# Patient Record
Sex: MALE | Race: WHITE | NOT HISPANIC OR LATINO | Employment: STUDENT | URBAN - METROPOLITAN AREA
[De-identification: names, ages, dates, MRNs, and addresses within clinical notes are randomized per-mention and may not be internally consistent; named-entity substitution may affect disease eponyms.]

---

## 2018-12-27 ENCOUNTER — CONSULT (OUTPATIENT)
Dept: FAMILY MEDICINE CLINIC | Facility: CLINIC | Age: 23
End: 2018-12-27
Payer: COMMERCIAL

## 2018-12-27 VITALS
HEART RATE: 71 BPM | RESPIRATION RATE: 16 BRPM | WEIGHT: 198 LBS | HEIGHT: 71 IN | BODY MASS INDEX: 27.72 KG/M2 | DIASTOLIC BLOOD PRESSURE: 74 MMHG | SYSTOLIC BLOOD PRESSURE: 124 MMHG | OXYGEN SATURATION: 97 %

## 2018-12-27 DIAGNOSIS — Z01.818 PREOP EXAMINATION: ICD-10-CM

## 2018-12-27 DIAGNOSIS — Z23 NEEDS FLU SHOT: Primary | ICD-10-CM

## 2018-12-27 PROCEDURE — 99203 OFFICE O/P NEW LOW 30 MIN: CPT | Performed by: NURSE PRACTITIONER

## 2018-12-27 NOTE — PROGRESS NOTES
Assessment/Plan:     Diagnoses and all orders for this visit:    Needs flu shot  -     MULTI-DOSE VIAL: influenza vaccine, [id number], quadrivalent, 0 5 mL (AFLURIA, FLULAVAL, FLUZONE)    Preop examination      Cleared for oral surgery  Form completed and given to pt  Subjective:      Patient ID: Eric Rosado is a 20 y o  male  Here today for a pre-operative clearance for oral surgery on 01/10/2019 by Dr Earnest Ding  He reports a hx of a front lower tooth which never grew causing a cyst to form around it on her mandible  He denies any overt medical hx  He has had his wisdom teeth extracted and tongue surgery as an infant with no issues/complications to anesthesia  Denies any knowledge of family hx of issues with anesthesia  The following portions of the patient's history were reviewed and updated as appropriate: allergies, current medications, past family history, past medical history, past social history, past surgical history and problem list     Review of Systems   Constitutional: Negative  Respiratory: Negative  Cardiovascular: Negative  Neurological: Negative  Objective:      /74 (BP Location: Right arm, Patient Position: Sitting, Cuff Size: Standard)   Pulse 71   Resp 16   Ht 5' 10 5" (1 791 m)   Wt 89 8 kg (198 lb)   SpO2 97%   BMI 28 01 kg/m²          Physical Exam   Constitutional: He is oriented to person, place, and time  He appears well-developed and well-nourished  HENT:   Head: Normocephalic and atraumatic  Neck: Neck supple  No JVD present  No tracheal deviation present  No thyromegaly present  Cardiovascular: Normal rate, regular rhythm and normal heart sounds  Exam reveals no gallop and no friction rub  No murmur heard  Pulmonary/Chest: No respiratory distress  He has no wheezes  He has no rales  Lymphadenopathy:     He has no cervical adenopathy  Neurological: He is alert and oriented to person, place, and time     Psychiatric: He has a normal mood and affect  His behavior is normal  Judgment and thought content normal    Vitals reviewed  I have spent 25 minutes with Patient  today in which greater than 50% of this time was spent in counseling/coordination of care regarding Intructions for management and Patient and family education

## 2023-07-10 ENCOUNTER — HOSPITAL ENCOUNTER (EMERGENCY)
Facility: HOSPITAL | Age: 28
Discharge: HOME | End: 2023-07-10
Attending: EMERGENCY MEDICINE
Payer: COMMERCIAL

## 2023-07-10 VITALS
BODY MASS INDEX: 23.8 KG/M2 | HEIGHT: 71 IN | HEART RATE: 72 BPM | TEMPERATURE: 98.2 F | WEIGHT: 170 LBS | OXYGEN SATURATION: 98 % | RESPIRATION RATE: 16 BRPM | SYSTOLIC BLOOD PRESSURE: 121 MMHG | DIASTOLIC BLOOD PRESSURE: 66 MMHG

## 2023-07-10 DIAGNOSIS — R19.7 DIARRHEA: ICD-10-CM

## 2023-07-10 DIAGNOSIS — N41.8: Primary | ICD-10-CM

## 2023-07-10 DIAGNOSIS — A56.19: Primary | ICD-10-CM

## 2023-07-10 LAB
ALBUMIN SERPL BCP-MCNC: 4.1 G/DL (ref 3.5–5)
ALP SERPL-CCNC: 47 U/L (ref 34–104)
ALT SERPL W P-5'-P-CCNC: 14 U/L (ref 7–52)
ANION GAP SERPL CALCULATED.3IONS-SCNC: 11 MMOL/L
AST SERPL W P-5'-P-CCNC: 12 U/L (ref 13–39)
BASOPHILS # BLD AUTO: 0.07 THOUSANDS/ÂΜL (ref 0–0.1)
BASOPHILS NFR BLD AUTO: 1 % (ref 0–1)
BILIRUB SERPL-MCNC: 0.49 MG/DL (ref 0.2–1)
BILIRUB UR QL STRIP: ABNORMAL
BUN SERPL-MCNC: 9 MG/DL (ref 5–25)
CALCIUM SERPL-MCNC: 9.3 MG/DL (ref 8.4–10.2)
CHLORIDE SERPL-SCNC: 102 MMOL/L (ref 96–108)
CLARITY UR: CLEAR
CO2 SERPL-SCNC: 26 MMOL/L (ref 21–32)
COLOR UR: YELLOW
CREAT SERPL-MCNC: 1.07 MG/DL (ref 0.6–1.3)
EOSINOPHIL # BLD AUTO: 0.07 THOUSAND/ÂΜL (ref 0–0.61)
EOSINOPHIL NFR BLD AUTO: 1 % (ref 0–6)
ERYTHROCYTE [DISTWIDTH] IN BLOOD BY AUTOMATED COUNT: 13.2 % (ref 11.6–15.1)
GFR SERPL CREATININE-BSD FRML MDRD: 93 ML/MIN/1.73SQ M
GLUCOSE SERPL-MCNC: 107 MG/DL (ref 65–140)
GLUCOSE UR STRIP-MCNC: NEGATIVE MG/DL
HCT VFR BLD AUTO: 47.6 % (ref 36.5–49.3)
HGB BLD-MCNC: 15.6 G/DL (ref 12–17)
HGB UR QL STRIP.AUTO: NEGATIVE
IMM GRANULOCYTES # BLD AUTO: 0.02 THOUSAND/UL (ref 0–0.2)
IMM GRANULOCYTES NFR BLD AUTO: 0 % (ref 0–2)
KETONES UR STRIP-MCNC: ABNORMAL MG/DL
LEUKOCYTE ESTERASE UR QL STRIP: NEGATIVE
LIPASE SERPL-CCNC: 18 U/L (ref 11–82)
LYMPHOCYTES # BLD AUTO: 2.67 THOUSANDS/ÂΜL (ref 0.6–4.47)
LYMPHOCYTES NFR BLD AUTO: 24 % (ref 14–44)
MCH RBC QN AUTO: 28.2 PG (ref 26.8–34.3)
MCHC RBC AUTO-ENTMCNC: 32.8 G/DL (ref 31.4–37.4)
MCV RBC AUTO: 86 FL (ref 82–98)
MONOCYTES # BLD AUTO: 1.18 THOUSAND/ÂΜL (ref 0.17–1.22)
MONOCYTES NFR BLD AUTO: 11 % (ref 4–12)
NEUTROPHILS # BLD AUTO: 6.95 THOUSANDS/ÂΜL (ref 1.85–7.62)
NEUTS SEG NFR BLD AUTO: 63 % (ref 43–75)
NITRITE UR QL STRIP: NEGATIVE
NRBC BLD AUTO-RTO: 0 /100 WBCS
PH UR STRIP.AUTO: 5.5 [PH]
PLATELET # BLD AUTO: 314 THOUSANDS/UL (ref 149–390)
PMV BLD AUTO: 10.2 FL (ref 8.9–12.7)
POTASSIUM SERPL-SCNC: 3.4 MMOL/L (ref 3.5–5.3)
PROT SERPL-MCNC: 6.7 G/DL (ref 6.4–8.4)
PROT UR STRIP-MCNC: NEGATIVE MG/DL
RBC # BLD AUTO: 5.54 MILLION/UL (ref 3.88–5.62)
SODIUM SERPL-SCNC: 139 MMOL/L (ref 135–147)
SP GR UR STRIP.AUTO: 1.01 (ref 1–1.03)
UROBILINOGEN UR QL STRIP.AUTO: 0.2 E.U./DL
WBC # BLD AUTO: 10.96 THOUSAND/UL (ref 4.31–10.16)

## 2023-07-10 PROCEDURE — 96360 HYDRATION IV INFUSION INIT: CPT

## 2023-07-10 PROCEDURE — 81003 URINALYSIS AUTO W/O SCOPE: CPT

## 2023-07-10 PROCEDURE — 80053 COMPREHEN METABOLIC PANEL: CPT

## 2023-07-10 PROCEDURE — 36415 COLL VENOUS BLD VENIPUNCTURE: CPT

## 2023-07-10 PROCEDURE — 83690 ASSAY OF LIPASE: CPT

## 2023-07-10 PROCEDURE — 99283 EMERGENCY DEPT VISIT LOW MDM: CPT

## 2023-07-10 PROCEDURE — 85025 COMPLETE CBC W/AUTO DIFF WBC: CPT

## 2023-07-10 PROCEDURE — 96372 THER/PROPH/DIAG INJ SC/IM: CPT

## 2023-07-10 RX ORDER — DOXYCYCLINE HYCLATE 100 MG/1
100 CAPSULE ORAL 2 TIMES DAILY
Qty: 18 CAPSULE | Refills: 0 | Status: SHIPPED | OUTPATIENT
Start: 2023-07-10 | End: 2023-07-19

## 2023-07-10 RX ORDER — POTASSIUM CHLORIDE 20 MEQ/1
40 TABLET, EXTENDED RELEASE ORAL ONCE
Status: COMPLETED | OUTPATIENT
Start: 2023-07-10 | End: 2023-07-10

## 2023-07-10 RX ORDER — BUPROPION HYDROCHLORIDE 150 MG/1
150 TABLET ORAL DAILY
COMMUNITY

## 2023-07-10 RX ADMIN — SODIUM CHLORIDE 1000 ML: 0.9 INJECTION, SOLUTION INTRAVENOUS at 13:25

## 2023-07-10 RX ADMIN — LIDOCAINE HYDROCHLORIDE 500 MG: 10 INJECTION, SOLUTION EPIDURAL; INFILTRATION; INTRACAUDAL; PERINEURAL at 15:15

## 2023-07-10 RX ADMIN — POTASSIUM CHLORIDE 40 MEQ: 1500 TABLET, EXTENDED RELEASE ORAL at 14:52

## 2023-07-10 NOTE — ED ATTENDING ATTESTATION
7/10/2023      Wagner Mccloud DO, saw and evaluated the patient. I have discussed the patient with Dr Jeanne Chun and agree with his findings, Plan of Care, and MDM as documented in his note, except where noted. All available labs and Radiology studies were reviewed. I was present for key portions of any procedure(s) performed by Dr Jeanne Chun, and I was immediately available to provide assistance. At this point I agree with the current assessment done in the Emergency Department. I have conducted an independent evaluation of this patient. The history and physical is as follows:    29-year-old male presents to the emergency department for evaluation of diarrhea that is not present for the past 4 days consisting of completely liquid/nb stool accompanied by mild abdominal cramping along with urinary frequency/urgency/dysuria without hematuria; this is accompanied by a suprapubic burning/pressure that he also feels in the perineum. This seems to be worsening since yesterday. He was placed on doxycycline 4 days ago after being evaluated in urgent care for the same symptoms. He had urinalysis performed at the urgent care that was normal, but had subsequent testing performed at [facility] that confirmed chlamydial infection. This was based on a urinary sample. He undergoes frequent STI screenings, and has never had an STI previously. Has taken doxycycline on 1 previous occasion without diarrhea as far as he can recall. No prior GI/ surgery. No prior history of prostatitis. Sexually active with male partners with both insertive and receptive anal intercourse. Does use barrier contraceptives at times but not consistently. Not on HIV PrEP.   ROS as per HPI; otherwise negative. General: Awake/alert/no distress. Vital signs and nursing notes reviewed    HEENT:  Normocephalic/atraumatic  External ear/hearing wnl bilaterally. Neck:  Phonation normal with no stridor/dysphonia. Normal active ROM.   No palpable masses or thyromegaly. No overlying skin changes. Cardiac:  Radial pulses 2+ bilaterally  DP/PT pulses 2+ bilaterally  RRR with s1/s2; no m/r/g. Pulmonary:   No respiratory distress. No accessory muscle use  Lungs CTA b/l with no w/c/r    Abdomen:  Flat. Nondistended. Nontender. No palpable masses. No guarding/rebound ttp. No CVA ttp. Skin:  Warm/dry. No diaphoresis. No visible rashes or skin changes. Neuro:  GCS 15. PERRLA; EOMI. Facial expressions symmetric. Tongue/uvula midline. Shoulder shrug equal bilaterally  Strength 5/5 in UE/LE bilaterally  Intact sensation in UE/LE bilaterally. ED Course  ED Course as of 07/10/23 1529   Mon Jul 10, 2023   1342 CBC and differential(!)  Mild leukocytosis. Hemoglobin/hematocrit normal.  Platelets normal.   6844 UA w Reflex to Microscopic w Reflex to Culture(!)  Trace ketones with small bilirubin. 1403 Lipase  Normal   1403 Comprehensive metabolic panel(!)  Mild hypokalemia. Mildly decreased AST. Renal function normal.     Lab testing showed mild hypokalemia likely secondary to diarrhea but no other significant disturbances. Mild leukocytosis present. No evidence of urinary infection was present, suggesting that there was not a secondary superimposed bacterial lower urinary tract infection/prostatitis in addition to chlamydial infection. Given that chlamydial infection was confirmed on outside testing, this seems to be more complicated than a simple urethritis and instead has progressed to a prostatitis. Treatment recommendations include administration of ceftriaxone as well as 14 days of doxycycline. This would be appropriate in this case given the prostatitis symptoms. Diarrhea seems unfortunately to be secondary to the doxycycline use although again without any concerning or dangerous features. He could certainly use an antidiarrheal/antimotility agent in the setting of diarrhea to permit him to complete the antibiotic therapy. Will need test of cure following completion of antibiotic therapy. All questions were answered to the satisfaction of patient prior to discharge. He expressed understanding and agreed to plan.       Critical Care Time  Procedures

## 2023-07-10 NOTE — ED PROVIDER NOTES
History  Chief Complaint   Patient presents with   • Diarrhea     Patient reports testing positive for Chlamydia and thinks he is having complications for it. Reports abdominal pain, diarrhea, genital pain and rectum pain. Last took 1000 mg tylenol and 2 tablets imodium approximately 2 hrs ago. 25-year-old male with history of STI (chlamydia) diagnosed by urine 5 days ago, presents today with continued urinary frequency/urgency/dysuria and diarrhea. Started doxycycline 4 days ago and has been compliant. States the diarrhea started proxy 1 day after initiating antibiotics. He did note approximately 10 loose bowel movements a day with streaks of blood on toilet paper recently. Denies history of hemorrhoids. Patient also noted suprapubic pain that he describes as burning/pressure. Symptoms have not resolved and has been constant. Also noted feelings of fluctuating temperatures with alternating warm/chills, never measured with a thermometer. Denies chest pain/shortness of breath, headache/vision changes. Prior to Admission Medications   Prescriptions Last Dose Informant Patient Reported? Taking? buPROPion (WELLBUTRIN XL) 150 mg 24 hr tablet   Yes Yes   Sig: Take 150 mg by mouth daily      Facility-Administered Medications: None       Past Medical History:   Diagnosis Date   • Depression        History reviewed. No pertinent surgical history. Family History   Problem Relation Age of Onset   • No Known Problems Mother    • No Known Problems Father      I have reviewed and agree with the history as documented. E-Cigarette/Vaping   • E-Cigarette Use Never User      E-Cigarette/Vaping Substances     Social History     Tobacco Use   • Smoking status: Former     Types: Cigarettes     Quit date:      Years since quittin.5   • Smokeless tobacco: Never   Vaping Use   • Vaping Use: Never used   Substance Use Topics   • Alcohol use:  Yes   • Drug use: No        Review of Systems Constitutional: Negative for chills and fever. HENT: Negative for hearing loss and rhinorrhea. Eyes: Negative for visual disturbance. Respiratory: Negative for cough and shortness of breath. Cardiovascular: Negative for chest pain. Gastrointestinal: Positive for abdominal pain (Suprapubic), blood in stool (Streaks on toilet paper), diarrhea and rectal pain. Negative for constipation, nausea and vomiting. Genitourinary: Positive for dysuria, penile pain and urgency. Negative for hematuria. Musculoskeletal: Negative for back pain. Skin: Negative for color change and rash. Neurological: Negative for weakness and numbness. Psychiatric/Behavioral: Negative for agitation. All other systems reviewed and are negative. Physical Exam  ED Triage Vitals [07/10/23 1223]   Temperature Pulse Respirations Blood Pressure SpO2   98.2 °F (36.8 °C) 75 18 121/59 96 %      Temp Source Heart Rate Source Patient Position - Orthostatic VS BP Location FiO2 (%)   Temporal Monitor Sitting Left arm --      Pain Score       3             Orthostatic Vital Signs  Vitals:    07/10/23 1223 07/10/23 1453   BP: 121/59 121/66   Pulse: 75 72   Patient Position - Orthostatic VS: Sitting Sitting       Physical Exam  Vitals and nursing note reviewed. Constitutional:       Appearance: He is well-developed. HENT:      Head: Normocephalic and atraumatic. Right Ear: External ear normal.      Left Ear: External ear normal.      Nose: Nose normal.      Mouth/Throat:      Mouth: Mucous membranes are moist.   Eyes:      Extraocular Movements: Extraocular movements intact. Cardiovascular:      Rate and Rhythm: Normal rate and regular rhythm. Pulses: Normal pulses. Heart sounds: Normal heart sounds. No murmur heard. Pulmonary:      Effort: Pulmonary effort is normal. No respiratory distress. Breath sounds: Normal breath sounds. Abdominal:      Palpations: Abdomen is soft. Tenderness:  There is no abdominal tenderness. Genitourinary:     Penis: Normal.       Rectum: Normal.   Musculoskeletal:         General: Normal range of motion. Cervical back: Neck supple. Skin:     General: Skin is warm and dry. Neurological:      General: No focal deficit present. Mental Status: He is alert.    Psychiatric:         Mood and Affect: Mood normal.         ED Medications  Medications   sodium chloride 0.9 % bolus 1,000 mL (0 mL Intravenous Stopped 7/10/23 1439)   potassium chloride (K-DUR,KLOR-CON) CR tablet 40 mEq (40 mEq Oral Given 7/10/23 1452)   cefTRIAXone (ROCEPHIN) 500 mg in lidocaine (PF) (XYLOCAINE-MPF) 1 % IM only syringe (500 mg Intramuscular Given 7/10/23 1515)       Diagnostic Studies  Results Reviewed     Procedure Component Value Units Date/Time    Comprehensive metabolic panel [[id number]]  (Abnormal) Collected: 07/10/23 1323    Lab Status: Final result Specimen: Blood from Arm, Right Updated: 07/10/23 1347     Sodium 139 mmol/L      Potassium 3.4 mmol/L      Chloride 102 mmol/L      CO2 26 mmol/L      ANION GAP 11 mmol/L      BUN 9 mg/dL      Creatinine 1.07 mg/dL      Glucose 107 mg/dL      Calcium 9.3 mg/dL      AST 12 U/L      ALT 14 U/L      Alkaline Phosphatase 47 U/L      Total Protein 6.7 g/dL      Albumin 4.1 g/dL      Total Bilirubin 0.49 mg/dL      eGFR 93 ml/min/1.73sq m     Narrative:      [facility] guidelines for Chronic Kidney Disease (CKD):   •  Stage 1 with normal or high GFR (GFR > 90 mL/min/1.73 square meters)  •  Stage 2 Mild CKD (GFR = 60-89 mL/min/1.73 square meters)  •  Stage 3A Moderate CKD (GFR = 45-59 mL/min/1.73 square meters)  •  Stage 3B Moderate CKD (GFR = 30-44 mL/min/1.73 square meters)  •  Stage 4 Severe CKD (GFR = 15-29 mL/min/1.73 square meters)  •  Stage 5 End Stage CKD (GFR <15 mL/min/1.73 square meters)  Note: GFR calculation is accurate only with a steady state creatinine    Lipase [[id number]]  (Normal) Collected: 07/10/23 4835 Lab Status: Final result Specimen: Blood from Arm, Right Updated: 07/10/23 1347     Lipase 18 u/L     UA w Reflex to Microscopic w Reflex to Culture [[id number]]  (Abnormal) Collected: 07/10/23 1323    Lab Status: Final result Specimen: Urine, Clean Catch Updated: 07/10/23 1331     Color, UA Yellow     Clarity, UA Clear     Specific Gravity, UA 1.010     pH, UA 5.5     Leukocytes, UA Negative     Nitrite, UA Negative     Protein, UA Negative mg/dl      Glucose, UA Negative mg/dl      Ketones, UA Trace mg/dl      Urobilinogen, UA 0.2 E.U./dl      Bilirubin, UA Small     Occult Blood, UA Negative    CBC and differential [[id number]]  (Abnormal) Collected: 07/10/23 1323    Lab Status: Final result Specimen: Blood from Arm, Right Updated: 07/10/23 1329     WBC 10.96 Thousand/uL      RBC 5.54 Million/uL      Hemoglobin 15.6 g/dL      Hematocrit 47.6 %      MCV 86 fL      MCH 28.2 pg      MCHC 32.8 g/dL      RDW 13.2 %      MPV 10.2 fL      Platelets 217 Thousands/uL      nRBC 0 /100 WBCs      Neutrophils Relative 63 %      Immat GRANS % 0 %      Lymphocytes Relative 24 %      Monocytes Relative 11 %      Eosinophils Relative 1 %      Basophils Relative 1 %      Neutrophils Absolute 6.95 Thousands/µL      Immature Grans Absolute 0.02 Thousand/uL      Lymphocytes Absolute 2.67 Thousands/µL      Monocytes Absolute 1.18 Thousand/µL      Eosinophils Absolute 0.07 Thousand/µL      Basophils Absolute 0.07 Thousands/µL                  No orders to display         Procedures  Procedures      ED Course  ED Course as of 07/10/23 1515   Mon Jul 10, 2023   1340 Patient denied rectal exam at this time. 1340 UA w Reflex to Microscopic w Reflex to Culture(!)  UA showed no leukocytes or nitrites. Trace ketones. Patient given liter bolus NS.   1341 CBC and differential(!)  Very minor leukocytosis, otherwise within normal limits and reassuring.                              SBIRT 22yo+    Flowsheet Row Most Recent Value   Initial Alcohol Screen: US AUDIT-C     1. How often do you have a drink containing alcohol? 0 Filed at: 07/10/2023 1223   2. How many drinks containing alcohol do you have on a typical day you are drinking? 0 Filed at: 07/10/2023 1223   3a. Male UNDER 65: How often do you have five or more drinks on one occasion? 0 Filed at: 07/10/2023 1223   3b. FEMALE Any Age, or MALE 65+: How often do you have 4 or more drinks on one occassion? 0 Filed at: 07/10/2023 1223   Audit-C Score 0 Filed at: 07/10/2023 1223   AMMON: How many times in the past year have you. .. Used an illegal drug or used a prescription medication for non-medical reasons? Never Filed at: 07/10/2023 1223                Medical Decision Making  DDx including but not limited to: prostatitis, viral illness, colitis, enteritis, metabolic abnormality, appendicitis, pancreatitis, hepatitis, mesenteric adenitis, cholecystitis, pyelonephritis, UTI, renal colicMac Jean presented due to continued pain and irritation in his genital region. He was diagnosed with chlamydia 5 days ago and started on tetracycline for treatment. He states he has had continued dysuria. He has now developed diarrhea several times a day. He also notes suprapubic pain and pressure. Clinical suspicion was high for prostatitis due to the myriad of symptoms. Patient did deny rectal exam at this time. Physical exam was largely unremarkable with no Goodrich sign or tenderness at McBurney's point. Patient denied any recent sick contacts. Lab work showed very mild leukocytosis and hypokalemia, otherwise within normal limits and reassuring with no signs of endorgan damage or systemic infection. UA showed no signs of infection including pyelonephritis, UTI. Lipase normal.  Due to his myriad of symptoms he will be treated for prostatitis and was given 500 mg Rocephin IM here in the ED and his tetracycline will be extended for another 7 days. Patient understood and agreed with this plan.   Patient discharged in stable condition. Strict return precautions given if symptoms are worsening or not resolving. Amount and/or Complexity of Data Reviewed  Labs: ordered. Decision-making details documented in ED Course. Risk  Prescription drug management. Disposition  Final diagnoses:   Chlamydial prostatitis   Diarrhea     Time reflects when diagnosis was documented in both MDM as applicable and the Disposition within this note     Time User Action Codes Description Comment    7/10/2023  2:32 PM Cira [place] Add [A56.19,  N41.8] Chlamydial prostatitis     7/10/2023  2:32 PM Cira [place] Add [R19.7] Diarrhea       ED Disposition     ED Disposition   Discharge    Condition   Stable    Date/Time   Mon Jul 10, 2023  2:32 PM    Comment   Stan Kilgore discharge to home/self care. Follow-up Information     Follow up With Specialties Details Why Contact Info Additional [id number] [facility], [address] [place] [address], Nurse Practitioner Call in 3 days As needed, For ED follow-up [address] [place] [id number]       [facility] Emergency Department Emergency Medicine Go to  If symptoms worsen or do not resolve [address] [place] [id number]  [address] [place] [address] Emergency Department, [address] [place] [address], [facility]          Patient's Medications   Discharge Prescriptions    DOXYCYCLINE HYCLATE (VIBRAMYCIN) 100 MG CAPSULE    Take 1 capsule (100 mg total) by mouth 2 (two) times a day for 9 days       Start Date: 7/10/2023 End Date: 7/19/2023       Order Dose: 100 mg       Quantity: 18 capsule    Refills: 0     No discharge procedures on file. PDMP Review     None           ED Provider  Attending physically available and evaluated Stan Kilgore. I managed the patient along with the ED Attending.     Electronically Signed by         Shilpa Oviedo DO  07/10/23 8759

## 2024-06-21 ENCOUNTER — RA CDI HCC (OUTPATIENT)
Dept: OTHER | Facility: HOSPITAL | Age: 29
End: 2024-06-21

## 2024-06-21 NOTE — PROGRESS NOTES
HCC coding opportunities       Chart reviewed, no opportunity found: CHART REVIEWED, NO OPPORTUNITY FOUND       Patients Insurance        Commercial Insurance: [employer] Commercial [employer]

## 2024-07-15 DIAGNOSIS — Z00.6 ENCOUNTER FOR EXAMINATION FOR NORMAL COMPARISON OR CONTROL IN CLINICAL RESEARCH PROGRAM: ICD-10-CM

## 2024-08-12 ENCOUNTER — OFFICE VISIT (OUTPATIENT)
Dept: FAMILY MEDICINE CLINIC | Facility: CLINIC | Age: 29
End: 2024-08-12
Payer: COMMERCIAL

## 2024-08-12 VITALS
HEART RATE: 95 BPM | BODY MASS INDEX: 21.19 KG/M2 | WEIGHT: 151.4 LBS | DIASTOLIC BLOOD PRESSURE: 62 MMHG | OXYGEN SATURATION: 95 % | HEIGHT: 71 IN | SYSTOLIC BLOOD PRESSURE: 122 MMHG

## 2024-08-12 DIAGNOSIS — Z11.4 SCREENING FOR HIV (HUMAN IMMUNODEFICIENCY VIRUS): ICD-10-CM

## 2024-08-12 DIAGNOSIS — Z13.1 SCREENING FOR DIABETES MELLITUS: ICD-10-CM

## 2024-08-12 DIAGNOSIS — Z13.220 SCREENING, LIPID: ICD-10-CM

## 2024-08-12 DIAGNOSIS — A51.49 SECONDARY SYPHILIS IN MALE: ICD-10-CM

## 2024-08-12 DIAGNOSIS — F33.1 MODERATE EPISODE OF RECURRENT MAJOR DEPRESSIVE DISORDER (HCC): Primary | ICD-10-CM

## 2024-08-12 PROCEDURE — 99385 PREV VISIT NEW AGE 18-39: CPT | Performed by: INTERNAL MEDICINE

## 2024-08-12 PROCEDURE — 99204 OFFICE O/P NEW MOD 45 MIN: CPT | Performed by: INTERNAL MEDICINE

## 2024-08-12 RX ORDER — BUPROPION HYDROCHLORIDE 150 MG/1
150 TABLET ORAL DAILY
Qty: 90 TABLET | Refills: 0 | Status: SHIPPED | OUTPATIENT
Start: 2024-08-12
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
## 2024-08-19 ENCOUNTER — TELEPHONE (OUTPATIENT)
Age: 29
End: 2024-08-19

## 2024-08-19 NOTE — TELEPHONE ENCOUNTER
Patient called in to schedule an id consult , I advise the patient he will need referral , patient will call his PCP doctor to put in a referral , I also advised the patient once we receive the referral the referral will be reviewed and we will contact him .

## 2024-08-21 ENCOUNTER — TELEPHONE (OUTPATIENT)
Dept: SURGERY | Facility: CLINIC | Age: 29
End: 2024-08-21

## 2024-08-21 DIAGNOSIS — Z13.1 SCREENING FOR DIABETES MELLITUS: ICD-10-CM

## 2024-08-21 DIAGNOSIS — Z01.84 IMMUNITY STATUS TESTING: ICD-10-CM

## 2024-08-21 DIAGNOSIS — Z11.1 SCREENING-PULMONARY TB: ICD-10-CM

## 2024-08-21 DIAGNOSIS — B20 HIV DISEASE (HCC): Primary | ICD-10-CM

## 2024-08-21 DIAGNOSIS — Z11.59 ENCOUNTER FOR SCREENING FOR OTHER VIRAL DISEASES: ICD-10-CM

## 2024-08-21 DIAGNOSIS — Z01.812 BLOOD TESTS PRIOR TO TREATMENT OR PROCEDURE: ICD-10-CM

## 2024-08-21 DIAGNOSIS — Z79.899 OTHER LONG TERM (CURRENT) DRUG THERAPY: ICD-10-CM

## 2024-08-21 NOTE — TELEPHONE ENCOUNTER
Attempted to contact patient, as a means to confirm interest/initiation with [facility] program.   Message left, instructing patient to call [phone] to discuss/review further.

## 2024-08-22 ENCOUNTER — TELEPHONE (OUTPATIENT)
Dept: SURGERY | Facility: CLINIC | Age: 29
End: 2024-08-22

## 2024-08-22 ENCOUNTER — PATIENT OUTREACH (OUTPATIENT)
Dept: SURGERY | Facility: CLINIC | Age: 29
End: 2024-08-22

## 2024-08-22 NOTE — PROGRESS NOTES
Consultation - Infectious Disease   Donavan Miller 28 y.o. male MRN: [id number]  Unit/Bed#:  Encounter: [id number]      IMPRESSION & RECOMMENDATIONS:   1. HIV disease (HCC)  Overview:  New diagnosis 8/12/24.  Patient previously on PrEP October 2023 to January 2024 with Descovy through a virtual online service.  HIV testing performed for patient to restart PrEP and was positive.  Reports prior HIV testing was negative in January 2024.  Patient MSM, HIV acquired through sexual contact.  Patient reports multiple sexual partners in the last 6 months.  Although HIV was likely acquired well he was off of Descovy, would still consider possibility of resistance.  Assessment & Plan:  -Start Biktarvy daily and Prezcobix daily due to prior PrEP use  -Follow-up labs collected this morning  -If genotype does not reveal underlying resistance, we will likely discontinue Prezcobix  -Patient was provided medication, adherence and prevention education  -Recheck labs in 4 weeks to assess for virologic control, coinfection's, drug toxicity  -Follow-up in 6 weeks  Orders:  -     Chlamydia/GC amplified DNA by PCR  -     UA (URINE) with reflex to Scope  -     Darunavir-Cobicistat 800-150 MG TABS; Take 1 tablet by mouth in the morning  -     bictegravir-emtricitab-tenofovir alafenamide (BIKTARVY) -25 MG tablet; Take 1 tablet by mouth daily with breakfast  2. Contact with and (suspected) exposure to infections with a predominantly sexual mode of transmission  -     Chlamydia/GC amplified DNA by PCR  -     UA (URINE) with reflex to Scope  3. Encounter for screening for bacterial sexually transmitted disease  -     Chlamydia/GC amplified DNA by PCR  -     UA (URINE) with reflex to Scope  4. Other problems related to lifestyle  -     Chlamydia/GC amplified DNA by PCR  -     UA (URINE) with reflex to Scope  5. Other specified disorders of white blood cells  -     Chlamydia/GC amplified DNA by PCR  -     UA (URINE) with reflex to  Scope  6. 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  RPR 1:16 dil 1/15/2024.  Status post 2 doses of IM benzathine penicillin G  Assessment & Plan:  -Follow-up repeat RPR to assess treatment response  7. Chlamydia  Overview:  History of chlamydia 7/2023 status post treatment.  Recent GC/CT 8/2024 negative.  Follow-up repeat testing.  8. Lymphadenopathy  Overview:  Patient with palpable sided posterior and anterior cervical lymph nodes.  May be related to HIV.  Assessment & Plan:  -Reassess lymphadenopathy following treatment initiation for HIV.  If lymph nodes remain enlarged would recommend ultrasound or CT imaging for further evaluation  9. Moderate episode of recurrent major depressive disorder (HCC)  Overview:  On Wellbutrin per PCP      My recommendations were discussed with the patient in detail who verbalized understanding.     HISTORY OF PRESENT ILLNESS:  Reason for Consult: HIV  HPI: Donavan Miller is a 28 y.o. year old male here for new patient evaluation for HIV. The patient is MSM, was previous on Descovy for PREP through a [employer] online service from 10/2023 to 1/2024. He states HIV testing was last negative in January. He was off of PREP after January until August and had lab testing ordered prior to restarting it.  Labs performed 8/12 showed positive fourth-generation HIV testing with confirmatory HIV-1 positive.  Gonorrhea and Chlamydia testing were negative.  The patient alerted his PCP and is now referred for HIV management.    Patient is MSM, was last sexually active 2 weeks ago.  He reports about 4 partners in the last 6 months.  Patient usually practices receptive anal intercourse.  7/2023 the patient was treated for chlamydia.  He was seen by an ID physician at [facility] in January 2024 for secondary syphilis with RPR 1:16 1/15/2024 after presenting with flulike symptoms, rash, fatigue, chills.  He was treated with IM benzathine penicillin G although patient reports he  received 2 doses of this.  He denies any other STDs or OIs.  The patient recently lost his job so is currently unemployed.  He reports prior drug use but has not used in the last 6 months.  The patient previously used methamphetamine (snorting), denies injection drug use.  He smokes 1/2 pack/day cigarettes and occasionally uses alcohol.  The patient also has a history anxiety and was started on Wellbutrin by his PCP.  He underwent micro laser liposuction on his abdomen, pectoral area, and under his chin on 2023.    The patient denies current fever, chills, weight loss.  He reports that he has had enlarged lymph nodes of the right neck for many years.  He reports some diarrhea and irregular bowel movements.  He denies any rashes, genital lesions, penile discharge.    REVIEW OF SYSTEMS:  A complete review of systems is negative other than that noted in the HPI    PAST MEDICAL HISTORY:  Past Medical History:   Diagnosis Date    Depression     HIV (human immunodeficiency virus infection) (formerly [facility])          Past Surgical History:   Procedure Laterality Date    LIPOSUCTION MULTIPLE BODY PARTS      WISDOM TOOTH EXTRACTION       FAMILY HISTORY:  Non-contributory    SOCIAL HISTORY:  Social History   Social History     Substance and Sexual Activity   Alcohol Use Yes    Alcohol/week: 1.0 standard drink of alcohol    Types: 1 Glasses of wine per week    Comment: socially 2x a week     Social History     Substance and Sexual Activity   Drug Use Not Currently    Types: Methamphetamines    Comment: 2024     Social History     Tobacco Use   Smoking Status Some Days    Current packs/day: 0.00    Types: Cigarettes    Last attempt to quit:     Years since quittin.6   Smokeless Tobacco Never       ALLERGIES:  No Known Allergies    MEDICATIONS:  All current active medications have been reviewed.    PHYSICAL EXAM:  Vitals:    24 0926   BP: 126/69   BP Location: Right arm   Patient Position: Sitting   Cuff  "Size: Standard   Pulse: 74   Resp: 17   Temp: (!) 97.1 °F (36.2 °C)   TempSrc: Tympanic   SpO2: 100%   Weight: 71.2 kg (157 lb)   Height: 5' 11\" (1.803 m)         General Appearance:  Appearing well, nontoxic, and in no distress   Head:  Normocephalic.  Enlarged palpable posterior and anterior cervical right-sided lymph nodes.  Enlarged postauricular right side lymph node   Eyes:  Conjunctiva pink and sclera anicteric, both eyes   Nose: Nares normal, mucosa normal, no drainage   Throat: Oropharynx moist without lesions   Neck: Supple, symmetrical, no adenopathy, no tenderness/mass/nodules   Back:   Symmetric, no curvature, ROM normal, no CVA tenderness   Lungs:   Clear to auscultation bilaterally, respirations unlabored   Chest Wall:  No tenderness or deformity   Heart:  RRR; no murmur, rub or gallop   Abdomen:   Soft, non-tender, non-distended, positive bowel sounds,    Extremities: No cyanosis, clubbing or edema   Skin: No rashes or lesions. No draining wounds noted.   Lymph nodes: Cervical, supraclavicular nodes normal   Neurologic: Alert and oriented times 3, extremity strength 5/5 and symmetric       LABS, IMAGING, & OTHER STUDIES:  Lab Results:  I have personally reviewed pertinent labs.  Lab Results   Component Value Date    K 3.4 (L) 07/10/2023     07/10/2023    CO2 26 07/10/2023    BUN 9 07/10/2023    CREATININE 1.07 07/10/2023    CALCIUM 9.3 07/10/2023    AST 12 (L) 07/10/2023    ALT 14 07/10/2023    ALKPHOS 47 07/10/2023    EGFR 93 07/10/2023     Lab Results   Component Value Date    WBC 10.96 (H) 07/10/2023    HGB 15.6 07/10/2023    HCT 47.6 07/10/2023    MCV 86 07/10/2023     07/10/2023     8/12/2024:  Fourth-generation HIV screen positive, confirmatory HIV-1 antibody positive  Urine GC/CT negative    Imaging Studies:   I have personally reviewed pertinent imaging study reports and images in PACS.    Other Studies:   I have personally reviewed pertinent reports.     "
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
## 2024-08-23 ENCOUNTER — PATIENT OUTREACH (OUTPATIENT)
Dept: SURGERY | Facility: CLINIC | Age: 29
End: 2024-08-23

## 2024-08-23 ENCOUNTER — OFFICE VISIT (OUTPATIENT)
Dept: SURGERY | Facility: CLINIC | Age: 29
End: 2024-08-23
Payer: COMMERCIAL

## 2024-08-23 ENCOUNTER — APPOINTMENT (OUTPATIENT)
Dept: LAB | Facility: CLINIC | Age: 29
End: 2024-08-23
Payer: COMMERCIAL

## 2024-08-23 VITALS
DIASTOLIC BLOOD PRESSURE: 69 MMHG | OXYGEN SATURATION: 100 % | SYSTOLIC BLOOD PRESSURE: 126 MMHG | HEIGHT: 71 IN | HEART RATE: 74 BPM | RESPIRATION RATE: 17 BRPM | BODY MASS INDEX: 21.98 KG/M2 | TEMPERATURE: 97.1 F | WEIGHT: 157 LBS

## 2024-08-23 DIAGNOSIS — D72.89 OTHER SPECIFIED DISORDERS OF WHITE BLOOD CELLS: ICD-10-CM

## 2024-08-23 DIAGNOSIS — F33.1 MODERATE EPISODE OF RECURRENT MAJOR DEPRESSIVE DISORDER (HCC): ICD-10-CM

## 2024-08-23 DIAGNOSIS — B20 HIV DISEASE (HCC): ICD-10-CM

## 2024-08-23 DIAGNOSIS — A74.9 CHLAMYDIA: ICD-10-CM

## 2024-08-23 DIAGNOSIS — Z13.1 SCREENING FOR DIABETES MELLITUS: ICD-10-CM

## 2024-08-23 DIAGNOSIS — Z13.220 SCREENING, LIPID: ICD-10-CM

## 2024-08-23 DIAGNOSIS — Z72.89 OTHER PROBLEMS RELATED TO LIFESTYLE: ICD-10-CM

## 2024-08-23 DIAGNOSIS — A51.49 SECONDARY SYPHILIS IN MALE: ICD-10-CM

## 2024-08-23 DIAGNOSIS — Z79.899 OTHER LONG TERM (CURRENT) DRUG THERAPY: ICD-10-CM

## 2024-08-23 DIAGNOSIS — B20 HIV DISEASE (HCC): Primary | ICD-10-CM

## 2024-08-23 DIAGNOSIS — Z01.812 BLOOD TESTS PRIOR TO TREATMENT OR PROCEDURE: ICD-10-CM

## 2024-08-23 DIAGNOSIS — Z11.3 ENCOUNTER FOR SCREENING FOR BACTERIAL SEXUALLY TRANSMITTED DISEASE: ICD-10-CM

## 2024-08-23 DIAGNOSIS — A53.9 SYPHILIS: ICD-10-CM

## 2024-08-23 DIAGNOSIS — R59.1 LYMPHADENOPATHY: ICD-10-CM

## 2024-08-23 DIAGNOSIS — Z20.2 CONTACT WITH AND (SUSPECTED) EXPOSURE TO INFECTIONS WITH A PREDOMINANTLY SEXUAL MODE OF TRANSMISSION: ICD-10-CM

## 2024-08-23 DIAGNOSIS — Z11.59 ENCOUNTER FOR SCREENING FOR OTHER VIRAL DISEASES: ICD-10-CM

## 2024-08-23 DIAGNOSIS — Z01.84 IMMUNITY STATUS TESTING: ICD-10-CM

## 2024-08-23 DIAGNOSIS — Z00.6 ENCOUNTER FOR EXAMINATION FOR NORMAL COMPARISON OR CONTROL IN CLINICAL RESEARCH PROGRAM: ICD-10-CM

## 2024-08-23 LAB
ALBUMIN SERPL BCG-MCNC: 3.9 G/DL (ref 3.5–5)
ALP SERPL-CCNC: 63 U/L (ref 34–104)
ALT SERPL W P-5'-P-CCNC: 11 U/L (ref 7–52)
ANION GAP SERPL CALCULATED.3IONS-SCNC: 6 MMOL/L (ref 4–13)
AST SERPL W P-5'-P-CCNC: 12 U/L (ref 13–39)
BACTERIA UR QL AUTO: ABNORMAL /HPF
BASOPHILS # BLD MANUAL: 0.05 THOUSAND/UL (ref 0–0.1)
BASOPHILS NFR MAR MANUAL: 1 % (ref 0–1)
BILIRUB SERPL-MCNC: 0.28 MG/DL (ref 0.2–1)
BILIRUB UR QL STRIP: NEGATIVE
BUN SERPL-MCNC: 6 MG/DL (ref 5–25)
CALCIUM SERPL-MCNC: 8.8 MG/DL (ref 8.4–10.2)
CHLORIDE SERPL-SCNC: 105 MMOL/L (ref 96–108)
CHOLEST SERPL-MCNC: 127 MG/DL
CLARITY UR: CLEAR
CO2 SERPL-SCNC: 29 MMOL/L (ref 21–32)
COLOR UR: YELLOW
CREAT SERPL-MCNC: 0.79 MG/DL (ref 0.6–1.3)
DIFFERENTIAL COMMENT: ABNORMAL
EOSINOPHIL # BLD MANUAL: 0.35 THOUSAND/UL (ref 0–0.4)
EOSINOPHIL NFR BLD MANUAL: 7 % (ref 0–6)
ERYTHROCYTE [DISTWIDTH] IN BLOOD BY AUTOMATED COUNT: 14.1 % (ref 11.6–15.1)
EST. AVERAGE GLUCOSE BLD GHB EST-MCNC: 111 MG/DL
GFR SERPL CREATININE-BSD FRML MDRD: 120 ML/MIN/1.73SQ M
GLUCOSE P FAST SERPL-MCNC: 85 MG/DL (ref 65–99)
GLUCOSE UR STRIP-MCNC: NEGATIVE MG/DL
GRAN CASTS #/AREA URNS LPF: ABNORMAL /[LPF]
HAV AB SER QL IA: NORMAL
HBA1C MFR BLD: 5.5 %
HBV SURFACE AB SER-ACNC: <3 MIU/ML
HBV SURFACE AG SER QL: NORMAL
HCT VFR BLD AUTO: 44.3 % (ref 36.5–49.3)
HCV AB SER QL: NORMAL
HDLC SERPL-MCNC: 40 MG/DL
HGB BLD-MCNC: 13.9 G/DL (ref 12–17)
HGB UR QL STRIP.AUTO: NEGATIVE
HYALINE CASTS #/AREA URNS LPF: ABNORMAL /LPF
KETONES UR STRIP-MCNC: NEGATIVE MG/DL
LDLC SERPL CALC-MCNC: 70 MG/DL (ref 0–100)
LEUKOCYTE ESTERASE UR QL STRIP: NEGATIVE
LYMPHOCYTES # BLD AUTO: 2.2 THOUSAND/UL (ref 0.6–4.47)
LYMPHOCYTES # BLD AUTO: 34 % (ref 14–44)
MCH RBC QN AUTO: 26.6 PG (ref 26.8–34.3)
MCHC RBC AUTO-ENTMCNC: 31.4 G/DL (ref 31.4–37.4)
MCV RBC AUTO: 85 FL (ref 82–98)
MONOCYTES # BLD AUTO: 0.85 THOUSAND/UL (ref 0–1.22)
MONOCYTES NFR BLD: 17 % (ref 4–12)
MUCOUS THREADS UR QL AUTO: ABNORMAL
NEUTROPHILS # BLD MANUAL: 1.55 THOUSAND/UL (ref 1.85–7.62)
NEUTS SEG NFR BLD AUTO: 31 % (ref 43–75)
NITRITE UR QL STRIP: NEGATIVE
NON-SQ EPI CELLS URNS QL MICRO: ABNORMAL /HPF
NONHDLC SERPL-MCNC: 87 MG/DL
PH UR STRIP.AUTO: 5.5 [PH]
PLATELET # BLD AUTO: 196 THOUSANDS/UL (ref 149–390)
PLATELET BLD QL SMEAR: ADEQUATE
PMV BLD AUTO: 11 FL (ref 8.9–12.7)
POIKILOCYTOSIS BLD QL SMEAR: PRESENT
POTASSIUM SERPL-SCNC: 4.3 MMOL/L (ref 3.5–5.3)
PROT SERPL-MCNC: 7 G/DL (ref 6.4–8.4)
PROT UR STRIP-MCNC: ABNORMAL MG/DL
RBC # BLD AUTO: 5.23 MILLION/UL (ref 3.88–5.62)
RBC #/AREA URNS AUTO: ABNORMAL /HPF
RBC MORPH BLD: PRESENT
SMUDGE CELLS BLD QL SMEAR: PRESENT
SODIUM SERPL-SCNC: 140 MMOL/L (ref 135–147)
SP GR UR STRIP.AUTO: 1.02 (ref 1–1.03)
TRIGL SERPL-MCNC: 85 MG/DL
UROBILINOGEN UR STRIP-ACNC: <2 MG/DL
VARIANT LYMPHS # BLD AUTO: 10 %
WBC # BLD AUTO: 4.99 THOUSAND/UL (ref 4.31–10.16)
WBC #/AREA URNS AUTO: ABNORMAL /HPF
WBC CASTS URNS QL MICRO: ABNORMAL /LPF

## 2024-08-23 PROCEDURE — 86778 TOXOPLASMA ANTIBODY IGM: CPT

## 2024-08-23 PROCEDURE — 86780 TREPONEMA PALLIDUM: CPT

## 2024-08-23 PROCEDURE — 36415 COLL VENOUS BLD VENIPUNCTURE: CPT

## 2024-08-23 PROCEDURE — 86645 CMV ANTIBODY IGM: CPT

## 2024-08-23 PROCEDURE — 81001 URINALYSIS AUTO W/SCOPE: CPT | Performed by: STUDENT IN AN ORGANIZED HEALTH CARE EDUCATION/TRAINING PROGRAM

## 2024-08-23 PROCEDURE — 86708 HEPATITIS A ANTIBODY: CPT

## 2024-08-23 PROCEDURE — 86706 HEP B SURFACE ANTIBODY: CPT

## 2024-08-23 PROCEDURE — 87536 HIV-1 QUANT&REVRSE TRNSCRPJ: CPT

## 2024-08-23 PROCEDURE — 87591 N.GONORRHOEAE DNA AMP PROB: CPT | Performed by: STUDENT IN AN ORGANIZED HEALTH CARE EDUCATION/TRAINING PROGRAM

## 2024-08-23 PROCEDURE — 87491 CHLMYD TRACH DNA AMP PROBE: CPT | Performed by: STUDENT IN AN ORGANIZED HEALTH CARE EDUCATION/TRAINING PROGRAM

## 2024-08-23 PROCEDURE — 80061 LIPID PANEL: CPT

## 2024-08-23 PROCEDURE — 87340 HEPATITIS B SURFACE AG IA: CPT

## 2024-08-23 PROCEDURE — 99205 OFFICE O/P NEW HI 60 MIN: CPT | Performed by: STUDENT IN AN ORGANIZED HEALTH CARE EDUCATION/TRAINING PROGRAM

## 2024-08-23 PROCEDURE — 80053 COMPREHEN METABOLIC PANEL: CPT

## 2024-08-23 PROCEDURE — 86361 T CELL ABSOLUTE COUNT: CPT

## 2024-08-23 PROCEDURE — 86592 SYPHILIS TEST NON-TREP QUAL: CPT

## 2024-08-23 PROCEDURE — 86644 CMV ANTIBODY: CPT

## 2024-08-23 PROCEDURE — 87901 NFCT AGT GNTYP ALYS HIV1 REV: CPT

## 2024-08-23 PROCEDURE — 85007 BL SMEAR W/DIFF WBC COUNT: CPT

## 2024-08-23 PROCEDURE — 86777 TOXOPLASMA ANTIBODY: CPT

## 2024-08-23 PROCEDURE — 85027 COMPLETE CBC AUTOMATED: CPT

## 2024-08-23 PROCEDURE — 87900 PHENOTYPE INFECT AGENT DRUG: CPT

## 2024-08-23 PROCEDURE — 83036 HEMOGLOBIN GLYCOSYLATED A1C: CPT

## 2024-08-23 PROCEDURE — 86803 HEPATITIS C AB TEST: CPT
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
## 2024-08-23 NOTE — ASSESSMENT & PLAN NOTE
-Start Biktarvy daily and Prezcobix daily due to prior PrEP use  -Follow-up labs collected this morning  -If genotype does not reveal underlying resistance, we will likely discontinue Prezcobix  -Patient was provided medication, adherence and prevention education  -Recheck labs in 4 weeks to assess for virologic control, coinfection's, drug toxicity  -Follow-up in 6 weeks

## 2024-08-23 NOTE — PROGRESS NOTES
CM/CT agreement was reviewed and signed by ct. Agreement was scanned into ct's chart.     Grievance policy was reviewed & signed by ct. Ct declined a copy. Policy was scanned into ct's chart.

## 2024-08-23 NOTE — ASSESSMENT & PLAN NOTE
-Reassess lymphadenopathy following treatment initiation for HIV.  If lymph nodes remain enlarged would recommend ultrasound or CT imaging for further evaluation

## 2024-08-23 NOTE — PROGRESS NOTES
Ct made aware to provide this cm with job termination letter, last month's bank statements for his checking and saving account, copy of the remaining amount owed on his car, stipend, and proof of his 401k. Ct aware once this cm receives the documents, [place] application would be submitted.

## 2024-08-24 LAB — TREPONEMA PALLIDUM IGG+IGM AB [PRESENCE] IN SERUM OR PLASMA BY IMMUNOASSAY: REACTIVE

## 2024-08-25 LAB — RPR SER QL: NORMAL

## 2024-08-26 LAB
C TRACH DNA SPEC QL NAA+PROBE: NEGATIVE
CMV IGG SERPL QL IA: POSITIVE
CMV IGM SERPL QL IA: NEGATIVE
HIV1 RNA # PLAS NAA DL=20: ABNORMAL CP/ML
HIV1 RNA # PLAS NAA DL=20: DETECTED {COPIES}/ML
N GONORRHOEA DNA SPEC QL NAA+PROBE: NEGATIVE
T GONDII IGG SER QL IA: POSITIVE
T GONDII IGM SERPL QL IA: POSITIVE

## 2024-08-27 LAB
BASOPHILS # BLD AUTO: 0 X10E3/UL (ref 0–0.2)
BASOPHILS NFR BLD AUTO: 1 %
CD3+CD4+ CELLS # BLD: 694 /UL (ref 359–1519)
CD3+CD4+ CELLS NFR BLD: 28.9 % (ref 30.8–58.5)
EOSINOPHIL # BLD AUTO: 0.2 X10E3/UL (ref 0–0.4)
EOSINOPHIL NFR BLD AUTO: 5 %
ERYTHROCYTE [DISTWIDTH] IN BLOOD BY AUTOMATED COUNT: 14.6 % (ref 11.6–15.4)
HCT VFR BLD AUTO: 45.8 % (ref 37.5–51)
HGB BLD-MCNC: 14.1 G/DL (ref 13–17.7)
IMM GRANULOCYTES # BLD: 0 X10E3/UL (ref 0–0.1)
IMM GRANULOCYTES NFR BLD: 1 %
LYMPHOCYTES # BLD AUTO: 2.4 X10E3/UL (ref 0.7–3.1)
LYMPHOCYTES NFR BLD AUTO: 50 %
MCH RBC QN AUTO: 26.5 PG (ref 26.6–33)
MCHC RBC AUTO-ENTMCNC: 30.8 G/DL (ref 31.5–35.7)
MCV RBC AUTO: 86 FL (ref 79–97)
MONOCYTES # BLD AUTO: 0.4 X10E3/UL (ref 0.1–0.9)
MONOCYTES NFR BLD AUTO: 8 %
MORPHOLOGY BLD-IMP: ABNORMAL
NEUTROPHILS # BLD AUTO: 1.7 X10E3/UL (ref 1.4–7)
NEUTROPHILS NFR BLD AUTO: 35 %
PLATELET # BLD AUTO: 217 X10E3/UL (ref 150–450)
RBC # BLD AUTO: 5.32 X10E6/UL (ref 4.14–5.8)
WBC # BLD AUTO: 4.8 X10E3/UL (ref 3.4–10.8)

## 2024-08-28 ENCOUNTER — TELEPHONE (OUTPATIENT)
Dept: INFECTIOUS DISEASES | Facility: CLINIC | Age: 29
End: 2024-08-28

## 2024-08-28 ENCOUNTER — TELEPHONE (OUTPATIENT)
Age: 29
End: 2024-08-28

## 2024-08-28 LAB — T PALLIDUM AB SER QL AGGL: REACTIVE

## 2024-08-28 NOTE — TELEPHONE ENCOUNTER
Called patient to discuss lab results from last week and see how he is doing on ART. Went right to voicemail. Left voicemail to call back office.

## 2024-08-28 NOTE — TELEPHONE ENCOUNTER
Yesika from [facility] called inquiring about patient's blood work he had done. HIV- Active    Stated patient had labs done in [facility] [phone].    Yesika has been give office fax number to fax over blood work results.

## 2024-08-30 ENCOUNTER — RA CDI HCC (OUTPATIENT)
Dept: OTHER | Facility: HOSPITAL | Age: 29
End: 2024-08-30

## 2024-09-03 LAB
APOB+LDLR+PCSK9 GENE MUT ANL BLD/T: NOT DETECTED
BRCA1+BRCA2 DEL+DUP + FULL MUT ANL BLD/T: NOT DETECTED
HIV RT+PR MUT DET ISLT: NORMAL
HIV RT+PR MUT DET ISLT: NORMAL
MLH1+MSH2+MSH6+PMS2 GN DEL+DUP+FUL M: NOT DETECTED

## 2024-09-04 ENCOUNTER — TELEPHONE (OUTPATIENT)
Dept: SURGERY | Facility: CLINIC | Age: 29
End: 2024-09-04

## 2024-09-04 ENCOUNTER — PATIENT OUTREACH (OUTPATIENT)
Dept: SURGERY | Facility: CLINIC | Age: 29
End: 2024-09-04

## 2024-09-04 NOTE — PROGRESS NOTES
Ct discussion took place with RN, Lynne, Shelley LINDO, and PNBee in regards to being unable to get in contact with ct.     This cm reached out to ct via text. Awaiting a response.

## 2024-09-05 ENCOUNTER — PATIENT OUTREACH (OUTPATIENT)
Dept: SURGERY | Facility: CLINIC | Age: 29
End: 2024-09-05

## 2024-09-05 ENCOUNTER — TELEPHONE (OUTPATIENT)
Dept: SURGERY | Facility: CLINIC | Age: 29
End: 2024-09-05

## 2024-09-05 NOTE — TELEPHONE ENCOUNTER
Message left with patient's mother to have patient contact clinic ([phone]).  Mother stated patient is in process of acquiring new phone number/phone.  But, will relay message to patient.  Mother also reported, spoke with patient last night (9/4/24) and is well.    Will continue attempts to contact patient directly, as well as, update [facility] team.

## 2024-09-05 NOTE — PROGRESS NOTES
RW sliding fee scale application was completed and sent to the hospital financial counselors with Iris cc'd on the email. Application was scanned into ct's chart.

## 2024-09-06 ENCOUNTER — PATIENT OUTREACH (OUTPATIENT)
Dept: SURGERY | Facility: CLINIC | Age: 29
End: 2024-09-06

## 2024-09-06 NOTE — PROGRESS NOTES
Ct discussion took place with Lynne CACERES in regards to not being able to get in contact with ct.

## 2024-09-09 ENCOUNTER — TELEPHONE (OUTPATIENT)
Age: 29
End: 2024-09-09

## 2024-09-09 NOTE — TELEPHONE ENCOUNTER
Pt calls in and states that she has been trygin to get in touch with pt to see if he has started treatment. Informed her he is seen at the [facility] by . Swetha verbalize understanding gives thanks

## 2024-09-10 ENCOUNTER — TELEMEDICINE (OUTPATIENT)
Dept: FAMILY MEDICINE CLINIC | Facility: CLINIC | Age: 29
End: 2024-09-10
Payer: COMMERCIAL

## 2024-09-10 VITALS — WEIGHT: 156.97 LBS | HEIGHT: 71 IN | BODY MASS INDEX: 21.98 KG/M2

## 2024-09-10 DIAGNOSIS — A51.49 SECONDARY SYPHILIS IN MALE: ICD-10-CM

## 2024-09-10 DIAGNOSIS — B20 HIV DISEASE (HCC): Primary | ICD-10-CM

## 2024-09-10 PROCEDURE — 99214 OFFICE O/P EST MOD 30 MIN: CPT | Performed by: INTERNAL MEDICINE

## 2024-09-10 NOTE — ASSESSMENT & PLAN NOTE
His follow-up RPR was nonreactive.  He does have MHA-TP antibodies which may likely be permanent.

## 2024-09-10 NOTE — PROGRESS NOTES
Virtual Regular Visit  Name: Donavan Miller      : 1995      MRN: [id number]  Encounter Provider: Leo Hampton MD  Encounter Date: 9/10/2024   Encounter department: [facility]    Verification of patient location:    Patient is located at Home in the following state in which I hold an active license [place]    Assessment & Plan  HIV disease (HCC)  He is now on antiviral treatment.  I suspect that Dr. De Leon will eliminate one of the treatments since his genotype reveals that the virus is sensitive to all of the antivirals.  He does have a follow-up with Dr. De Leon in October.       Secondary syphilis in male  His follow-up RPR was nonreactive.  He does have MHA-TP antibodies which may likely be permanent.          I am unsure what the rash on his face and chest is secondary to.  I think it's possible that it could be related to his immune system reactivating though they did begin before he started treatment.  He should discuss this further with Dr. De Leon.  Encounter provider Leo Hampton MD    The patient was identified by name and date of birth. Donavan Miller was informed that this is a telemedicine visit and that the visit is being conducted through the Epic Embedded platform. He agrees to proceed..  My office door was closed. No one else was in the room.  He acknowledged consent and understanding of privacy and security of the video platform. The patient has agreed to participate and understands they can discontinue the visit at any time.    Patient is aware this is a billable service.     History of Present Illness       He was diagnosed with HIV and saw ID. He was started on Biktarvy and Prescovix out of concern of possible viral resistance. His HIV genotype revealed that the virus is sensitive to all of the antivirals tested.  He also mention that starting a couple weeks ago he has noticed some small red papules on his face and chest.  This began before the HIV  "treatment.    History obtained from : patient  Review of Systems        Objective     Ht 5' 11\" (1.803 m)   Wt 71.2 kg (156 lb 15.5 oz)   BMI 21.89 kg/m²   Physical Exam  Constitutional:       General: He is not in acute distress.     Appearance: Normal appearance. He is not ill-appearing.   Skin:     Comments: He does have faintly erythematous papules noted on his face.   Neurological:      Mental Status: He is alert.         Visit Time  Total Visit Duration: 7 minutes        "

## 2024-09-10 NOTE — ASSESSMENT & PLAN NOTE
He is now on antiviral treatment.  I suspect that Dr. De Leon will eliminate one of the treatments since his genotype reveals that the virus is sensitive to all of the antivirals.  He does have a follow-up with Dr. De Leon in October.

## 2024-09-11 ENCOUNTER — TELEPHONE (OUTPATIENT)
Dept: INFECTIOUS DISEASES | Facility: CLINIC | Age: 29
End: 2024-09-11

## 2024-09-11 NOTE — TELEPHONE ENCOUNTER
Called patient to discuss lab results per [employer] message. I explained all lab results to patient. He is doing well on ART without side effects. He states his pharmacy did not have medications right away so he started around 9/5.    He will get repeat lab work prior to next appointment, at the latest the Monday the week of his appointment.    The number listed in his chart is blocked to many outside callers so will ask office staff to add alternative number to chart--[phone].

## 2024-09-13 ENCOUNTER — PATIENT OUTREACH (OUTPATIENT)
Dept: SURGERY | Facility: CLINIC | Age: 29
End: 2024-09-13

## 2024-09-13 NOTE — PROGRESS NOTES
Ct provided this  with social security card and income. SPBP application was completed and faxed to [employer]. SPBP application was scanned into ct's chart with fax confirmation sheet.

## 2024-09-16 ENCOUNTER — PATIENT OUTREACH (OUTPATIENT)
Dept: SURGERY | Facility: CLINIC | Age: 29
End: 2024-09-16

## 2024-09-16 NOTE — PROGRESS NOTES
This cm received a message from Inocencia from [employer]. Per Inocencia, they had to pend ct's application due to dark social security card and being unable to accept bank statements with severance. Per Inocencia if ct does currently have income, this cm could generate a zero income letter with who his needs were being met by.     Zero income letter was generated, signed, and faxed with ct's social security card. Fax confirmation sheet was scanned into ct's chart.

## 2024-09-17 ENCOUNTER — PATIENT OUTREACH (OUTPATIENT)
Dept: SURGERY | Facility: CLINIC | Age: 29
End: 2024-09-17

## 2024-09-17 NOTE — PROGRESS NOTES
This cm spoke Inocencia from [employer] in regards to pending SPBP application. Ct was approved thru 12/31/2024 [id number].     Ct discussion took place with Brook in regards to ct's SPBP approval.    Ct was made aware and to send this cm a copy of his card when he receives it in the mail. Ct made aware he was approved for 90 days and needed to apply for MA as soon as possible.

## 2024-09-22 PROBLEM — Z11.3: Status: RESOLVED | Noted: 2024-08-23 | Resolved: 2024-09-22

## 2024-10-02 ENCOUNTER — TELEPHONE (OUTPATIENT)
Dept: SURGERY | Facility: CLINIC | Age: 29
End: 2024-10-02

## 2024-10-02 NOTE — TELEPHONE ENCOUNTER
LVM for patient to call me back. Wanted to give a reminder to get labs done for upcoming ID appointment on 10/11.

## 2024-10-08 ENCOUNTER — PATIENT OUTREACH (OUTPATIENT)
Dept: SURGERY | Facility: CLINIC | Age: 29
End: 2024-10-08

## 2024-10-09 ENCOUNTER — PATIENT OUTREACH (OUTPATIENT)
Dept: SURGERY | Facility: CLINIC | Age: 29
End: 2024-10-09

## 2024-10-10 ENCOUNTER — TELEPHONE (OUTPATIENT)
Dept: SURGERY | Facility: CLINIC | Age: 29
End: 2024-10-10

## 2024-10-10 ENCOUNTER — PATIENT OUTREACH (OUTPATIENT)
Dept: SURGERY | Facility: CLINIC | Age: 29
End: 2024-10-10

## 2024-10-11 ENCOUNTER — TELEPHONE (OUTPATIENT)
Dept: SURGERY | Facility: CLINIC | Age: 29
End: 2024-10-11

## 2024-10-14 ENCOUNTER — TELEPHONE (OUTPATIENT)
Dept: SURGERY | Facility: CLINIC | Age: 29
End: 2024-10-14

## 2024-10-16 DIAGNOSIS — B20 HIV DISEASE (HCC): ICD-10-CM

## 2024-10-18 ENCOUNTER — PATIENT OUTREACH (OUTPATIENT)
Dept: SURGERY | Facility: CLINIC | Age: 29
End: 2024-10-18

## 2024-10-21 ENCOUNTER — PATIENT OUTREACH (OUTPATIENT)
Dept: SURGERY | Facility: CLINIC | Age: 29
End: 2024-10-21

## 2024-11-14 ENCOUNTER — PATIENT OUTREACH (OUTPATIENT)
Dept: SURGERY | Facility: CLINIC | Age: 29
End: 2024-11-14

## 2024-11-14 DIAGNOSIS — F33.1 MODERATE EPISODE OF RECURRENT MAJOR DEPRESSIVE DISORDER (HCC): ICD-10-CM

## 2024-11-14 RX ORDER — BUPROPION HYDROCHLORIDE 150 MG/1
150 TABLET ORAL DAILY
Qty: 90 TABLET | Refills: 1 | Status: SHIPPED | OUTPATIENT
Start: 2024-11-14

## 2024-11-15 ENCOUNTER — PATIENT OUTREACH (OUTPATIENT)
Dept: SURGERY | Facility: CLINIC | Age: 29
End: 2024-11-15

## 2024-11-15 NOTE — PROGRESS NOTES
Ct let this cm know he apologizes for being silent for a while. Per ct, he is past some obstacles and hope to be more consistent with his regular care at [facility]. Ct requested to speak to this cm sometime today, wanted to know the status of his MA application, and wanted to check in. Ct agreeable to speaking with this cm via telephone at 1:30 pm.     This cm spoke to ct. Ct was made aware this cm did not submit his MA application due to still needing his 401k information. This cm let ct know this cm needed updated bank statements. Ct let this cm know he still looking for a full time job, but is currently receiving unemployment. Ct aware to provide this cm with unemployment information. This cm let ct know MA application would take about 30 days to process. Ct aware if he is ineligible for MA, we could complete a MAWD application. This cm let ct know to get labs completed as soon as possible. Ct aware SPBP was in his chart and would cover the cost of his labs. Ct aware the lab orders were in his chart. Ct let this cm know he would get his labs completed at the lab office close to the clinic on [address]. Ct let this cm know he was previously seeing psychiatry, but due to the cost he is unable to continue being seen. Ct is interested in this making a referral to [facility]. Ct aware [facility] is unable to prescribe or adjust medications. Ct verbalized understanding. Ct agreeable to getting his medications through [facility]. Ct made aware for [facility] to deliver his medications; they must speak with him over the phone prior to delivering the medications. Ct let this cm know he is doing okay.     List of needed MA documents for application was sent to ct via text.   Lab office address, contact number, and hours of operation was sent to ct.    Referral was made to [facility].     Ct discussion took place with RNLynne in regard to conversation with ct.      Ct discussion took place with Brook in regards to this cm  reminding ct to get his labs completed as soon as possible.

## 2024-11-22 ENCOUNTER — DOCUMENTATION (OUTPATIENT)
Dept: SURGERY | Facility: CLINIC | Age: 29
End: 2024-11-22

## 2024-11-22 NOTE — PROGRESS NOTES
Data: Pt was contacted at 12:10 p.m. by S to reschedule the f/u session, however, no VM can't be provided since voice mail is full. A text message was provided at 12:17 p.m. to reach out to coordinate requested f/u session.

## 2024-11-30 DIAGNOSIS — B20 HIV DISEASE (HCC): ICD-10-CM

## 2024-12-11 DIAGNOSIS — B20 HIV DISEASE (HCC): ICD-10-CM

## 2024-12-11 DIAGNOSIS — A53.9 SYPHILIS: Primary | ICD-10-CM

## 2024-12-12 NOTE — PROGRESS NOTES
Called [phone] and LM reminding pt to complete lab work for upcoming ID appt on 12/20 at 0900. LM to call office with any questions or concerns.

## 2024-12-13 ENCOUNTER — PATIENT OUTREACH (OUTPATIENT)
Dept: SURGERY | Facility: CLINIC | Age: 29
End: 2024-12-13

## 2024-12-15 ENCOUNTER — APPOINTMENT (OUTPATIENT)
Dept: LAB | Facility: HOSPITAL | Age: 29
End: 2024-12-15
Payer: COMMERCIAL

## 2024-12-15 DIAGNOSIS — B20 HIV DISEASE (HCC): ICD-10-CM

## 2024-12-15 DIAGNOSIS — A53.9 SYPHILIS: ICD-10-CM

## 2024-12-15 LAB
ALBUMIN SERPL BCG-MCNC: 4.3 G/DL (ref 3.5–5)
ALP SERPL-CCNC: 74 U/L (ref 34–104)
ALT SERPL W P-5'-P-CCNC: 13 U/L (ref 7–52)
ANION GAP SERPL CALCULATED.3IONS-SCNC: 7 MMOL/L (ref 4–13)
AST SERPL W P-5'-P-CCNC: 16 U/L (ref 13–39)
BASOPHILS # BLD AUTO: 0.03 THOUSANDS/ÂΜL (ref 0–0.1)
BASOPHILS NFR BLD AUTO: 0 % (ref 0–1)
BILIRUB SERPL-MCNC: 0.42 MG/DL (ref 0.2–1)
BILIRUB UR QL STRIP: NEGATIVE
BUN SERPL-MCNC: 7 MG/DL (ref 5–25)
CALCIUM SERPL-MCNC: 9.5 MG/DL (ref 8.4–10.2)
CHLORIDE SERPL-SCNC: 103 MMOL/L (ref 96–108)
CLARITY UR: CLEAR
CO2 SERPL-SCNC: 29 MMOL/L (ref 21–32)
COLOR UR: NORMAL
CREAT SERPL-MCNC: 0.92 MG/DL (ref 0.6–1.3)
EOSINOPHIL # BLD AUTO: 0.09 THOUSAND/ÂΜL (ref 0–0.61)
EOSINOPHIL NFR BLD AUTO: 1 % (ref 0–6)
ERYTHROCYTE [DISTWIDTH] IN BLOOD BY AUTOMATED COUNT: 13.4 % (ref 11.6–15.1)
GFR SERPL CREATININE-BSD FRML MDRD: 110 ML/MIN/1.73SQ M
GLUCOSE SERPL-MCNC: 89 MG/DL (ref 65–140)
GLUCOSE UR STRIP-MCNC: NEGATIVE MG/DL
HCT VFR BLD AUTO: 44.6 % (ref 36.5–49.3)
HGB BLD-MCNC: 14.8 G/DL (ref 12–17)
HGB UR QL STRIP.AUTO: NEGATIVE
IMM GRANULOCYTES # BLD AUTO: 0.02 THOUSAND/UL (ref 0–0.2)
IMM GRANULOCYTES NFR BLD AUTO: 0 % (ref 0–2)
KETONES UR STRIP-MCNC: NEGATIVE MG/DL
LEUKOCYTE ESTERASE UR QL STRIP: NEGATIVE
LYMPHOCYTES # BLD AUTO: 3.65 THOUSANDS/ÂΜL (ref 0.6–4.47)
LYMPHOCYTES NFR BLD AUTO: 40 % (ref 14–44)
MCH RBC QN AUTO: 27.6 PG (ref 26.8–34.3)
MCHC RBC AUTO-ENTMCNC: 33.2 G/DL (ref 31.4–37.4)
MCV RBC AUTO: 83 FL (ref 82–98)
MONOCYTES # BLD AUTO: 0.61 THOUSAND/ÂΜL (ref 0.17–1.22)
MONOCYTES NFR BLD AUTO: 7 % (ref 4–12)
NEUTROPHILS # BLD AUTO: 4.84 THOUSANDS/ÂΜL (ref 1.85–7.62)
NEUTS SEG NFR BLD AUTO: 52 % (ref 43–75)
NITRITE UR QL STRIP: NEGATIVE
NRBC BLD AUTO-RTO: 0 /100 WBCS
PH UR STRIP.AUTO: 5.5 [PH]
PLATELET # BLD AUTO: 333 THOUSANDS/UL (ref 149–390)
PMV BLD AUTO: 9.6 FL (ref 8.9–12.7)
POTASSIUM SERPL-SCNC: 4 MMOL/L (ref 3.5–5.3)
PROT SERPL-MCNC: 7.4 G/DL (ref 6.4–8.4)
PROT UR STRIP-MCNC: NEGATIVE MG/DL
RBC # BLD AUTO: 5.36 MILLION/UL (ref 3.88–5.62)
SODIUM SERPL-SCNC: 139 MMOL/L (ref 135–147)
SP GR UR STRIP.AUTO: 1.01 (ref 1–1.03)
UROBILINOGEN UR STRIP-ACNC: <2 MG/DL
WBC # BLD AUTO: 9.24 THOUSAND/UL (ref 4.31–10.16)

## 2024-12-15 PROCEDURE — 86592 SYPHILIS TEST NON-TREP QUAL: CPT

## 2024-12-15 PROCEDURE — 36415 COLL VENOUS BLD VENIPUNCTURE: CPT

## 2024-12-15 PROCEDURE — 80053 COMPREHEN METABOLIC PANEL: CPT

## 2024-12-15 PROCEDURE — 86780 TREPONEMA PALLIDUM: CPT

## 2024-12-15 PROCEDURE — 86360 T CELL ABSOLUTE COUNT/RATIO: CPT

## 2024-12-15 PROCEDURE — 85025 COMPLETE CBC W/AUTO DIFF WBC: CPT

## 2024-12-16 ENCOUNTER — RESULTS FOLLOW-UP (OUTPATIENT)
Dept: FAMILY MEDICINE CLINIC | Facility: CLINIC | Age: 29
End: 2024-12-16

## 2024-12-16 ENCOUNTER — PATIENT OUTREACH (OUTPATIENT)
Dept: SURGERY | Facility: CLINIC | Age: 29
End: 2024-12-16

## 2024-12-16 ENCOUNTER — APPOINTMENT (OUTPATIENT)
Dept: LAB | Facility: HOSPITAL | Age: 29
End: 2024-12-16
Payer: COMMERCIAL

## 2024-12-16 ENCOUNTER — TELEPHONE (OUTPATIENT)
Dept: SURGERY | Facility: CLINIC | Age: 29
End: 2024-12-16

## 2024-12-16 DIAGNOSIS — B20 HIV DISEASE (HCC): ICD-10-CM

## 2024-12-16 LAB
BASOPHILS # BLD AUTO: 0.1 X10E3/UL (ref 0–0.2)
BASOPHILS NFR BLD AUTO: 1 %
CD3+CD4+ CELLS # BLD: 1015 /UL (ref 359–1519)
CD3+CD4+ CELLS NFR BLD: 29 % (ref 30.8–58.5)
CD3+CD4+ CELLS/CD3+CD8+ CLL BLD: 0.61 % (ref 0.92–3.72)
CD3+CD8+ CELLS # BLD: 1656 /UL (ref 109–897)
CD3+CD8+ CELLS NFR BLD: 47.3 % (ref 12–35.5)
EOSINOPHIL # BLD AUTO: 0.1 X10E3/UL (ref 0–0.4)
EOSINOPHIL NFR BLD AUTO: 1 %
ERYTHROCYTE [DISTWIDTH] IN BLOOD BY AUTOMATED COUNT: 13.3 % (ref 11.6–15.4)
HCT VFR BLD AUTO: 46 % (ref 37.5–51)
HGB BLD-MCNC: 14.9 G/DL (ref 13–17.7)
IMM GRANULOCYTES # BLD: 0 X10E3/UL (ref 0–0.1)
IMM GRANULOCYTES NFR BLD: 0 %
LYMPHOCYTES # BLD AUTO: 3.5 X10E3/UL (ref 0.7–3.1)
LYMPHOCYTES NFR BLD AUTO: 39 %
MCH RBC QN AUTO: 27.8 PG (ref 26.6–33)
MCHC RBC AUTO-ENTMCNC: 32.4 G/DL (ref 31.5–35.7)
MCV RBC AUTO: 86 FL (ref 79–97)
MONOCYTES # BLD AUTO: 0.5 X10E3/UL (ref 0.1–0.9)
MONOCYTES NFR BLD AUTO: 6 %
NEUTROPHILS # BLD AUTO: 4.9 X10E3/UL (ref 1.4–7)
NEUTROPHILS NFR BLD AUTO: 53 %
PLATELET # BLD AUTO: 331 X10E3/UL (ref 150–450)
RBC # BLD AUTO: 5.36 X10E6/UL (ref 4.14–5.8)
TREPONEMA PALLIDUM IGG+IGM AB [PRESENCE] IN SERUM OR PLASMA BY IMMUNOASSAY: REACTIVE
WBC # BLD AUTO: 9.1 X10E3/UL (ref 3.4–10.8)

## 2024-12-16 PROCEDURE — 36415 COLL VENOUS BLD VENIPUNCTURE: CPT

## 2024-12-17 ENCOUNTER — TELEPHONE (OUTPATIENT)
Dept: SURGERY | Facility: CLINIC | Age: 29
End: 2024-12-17

## 2024-12-17 DIAGNOSIS — B20 HIV DISEASE (HCC): Primary | ICD-10-CM

## 2024-12-17 LAB — RPR SER QL: NORMAL

## 2024-12-17 NOTE — TELEPHONE ENCOUNTER
Spoke with Idalia from the lab and inquired about HIV-RNA lab being able to be ran with CD4  lab specimen as requested by Provider. Idalia states that it can not be done with that sample. I also inquired if anyone has reached out to the patient to let him know of the lab cancellation. She states it hasn't happened due to the way they received the report of the error/cancellation from the [place] lab and they're awaiting paper work. I asked if the patient can be notified, and she agreed. I explained I already tried with no response and this lab was needed for his appointment on Friday. I asked if it can be charted when the call is made and she states that is their policy. Two call attempts and then a letter.

## 2024-12-18 ENCOUNTER — PATIENT OUTREACH (OUTPATIENT)
Dept: SURGERY | Facility: CLINIC | Age: 29
End: 2024-12-18

## 2024-12-18 ENCOUNTER — TELEPHONE (OUTPATIENT)
Dept: SURGERY | Facility: CLINIC | Age: 29
End: 2024-12-18

## 2024-12-18 ENCOUNTER — APPOINTMENT (OUTPATIENT)
Dept: LAB | Facility: HOSPITAL | Age: 29
End: 2024-12-18
Payer: COMMERCIAL

## 2024-12-18 DIAGNOSIS — B20 HIV DISEASE (HCC): ICD-10-CM

## 2024-12-18 LAB — T PALLIDUM AB SER QL AGGL: REACTIVE

## 2024-12-18 PROCEDURE — 87536 HIV-1 QUANT&REVRSE TRNSCRPJ: CPT

## 2024-12-18 PROCEDURE — 36415 COLL VENOUS BLD VENIPUNCTURE: CPT

## 2024-12-18 NOTE — PROGRESS NOTES
Ct discussion took place with BELGICA Rosa in regards to this cm reaching out to ct.     This cm sent ct a text letting him know to contact the clinic as soon as possible. This cm provided ct with contact number for clinic.

## 2024-12-18 NOTE — TELEPHONE ENCOUNTER
Spoke with Emerald palomo [facility] in which she states they can't see any results for this patient and gave the number to the lab in which to call, [phone].

## 2024-12-18 NOTE — TELEPHONE ENCOUNTER
Data: Pt contacted S at 3:55 p.m. to coordinate a f/u session. Next contact was scheduled for 1/6/25 at 2:00 p.m. No SI or HI were disclosed within brief contact.

## 2024-12-18 NOTE — TELEPHONE ENCOUNTER
Spoke to Muriel from [facility] which states the test TREPONEMAL ANTIBODIES, TPPA only results reactive or non reactive; there is no dills to report with this test.

## 2024-12-19 ENCOUNTER — PATIENT OUTREACH (OUTPATIENT)
Dept: SURGERY | Facility: CLINIC | Age: 29
End: 2024-12-19

## 2024-12-19 NOTE — PROGRESS NOTES
Name: Donavan Miller      : 1995      MRN: [id number]  Encounter Provider: ANKIT Humphrey  Encounter Date: 2024   Encounter department: [facility]  :  Assessment & Plan  HIV disease (HCC)  Feels good on Biktarvy and Prezcobix with pending VL.  VL at dx was 241,000  copies.  New dx back in 2024 and was on Descovy for PrEP when HIV test was positive.  He was started on Biktarvy and Prezcobix due to concern for resistance to Descovy.    He reports missing 3 doses in the month of October due to getting medication refilled from pharmacy.     Partner has been tested for HIV and is on PrEP for about 3 months.   He reports condom use with anal sex but not oral.  Education on the importance of using condoms for all sexually activities  Continue Biktarvy and Prezcobix for now since VL is pending and follow up in 3 months  If VL > 200 copies will get labs in 4 weeks and follow up in 6 weeks  HIV Genotype showed sensitivity to all.   Stressed the importance of 100% medication adherence  Monitor CD4. HIV RNA, CMP, and CBCD to monitor for any developing virologic response, treatment failure, or drug toxicities  Follow up with Dr. Ron or Dr. De Leon   No OI's or IRIS noted   Agreed to use [facility] for pharmacy  HIV Counseling:    Viral Load: pending.  Was 241,000 copies at dx     CD4 Count: 1015      Denies side effects.  Stressed the importance of adherence.  Continue follow up in the ID clinic with Dr. Ron or Dr. De Leon.    Reviewed the most recent labs, including the CD4 and viral load.  Discussed the risks and benefits of treatment options, instructions for management, importance of treatment adherence, and reduction of risk factors.  Educated on possible medication side effects.    Counseled on routes of HIV transmission, including the risk of  infection.  Emphasized that viral suppression is the best method to prevent HIV transmission.  At this time the pt denies the need  for HIV testing of anyone in their life.    Total encounter time was greater than 35 minutes.  Greater than 20 minutes were spent on counseling and patient education.  Pt voices understanding and agreement with treatment plan.    Orders:    bictegravir-emtricitab-tenofovir alafenamide (BIKTARVY) -25 MG tablet; Take 1 tablet by mouth daily with breakfast    Darunavir-Cobicistat 800-150 MG TABS; Take 1 tablet by mouth in the morning    Need for COVID-19 vaccine  Declined all vaccines.   Orders:    COVID-19 Pfizer mRNA vaccine 12 yr and older (Comirnaty pre-filled syringe)    Need for influenza vaccination  Declined all vaccines.   Orders:    Flublok (recombinant) 0.5 mL IM    Chlamydia/GC amplified DNA by PCR; Future    Syphilis  S/P treatment RPR 1:16d dils with bicillin X 2 doses.  RPR remains non reactive.   Continue to monitor RPR     Orders:    RPR-Syphilis Screening (Total Syphilis IGG/IGM); Future    Chlamydia/GC amplified DNA by PCR; Future    Personal history of exposure to potentially hazardous body fluids         Contact with and (suspected) exposure to infections with a predominantly sexual mode of transmission         Encounter for special screening examination for infection with predominantly sexual mode of transmission    Orders:    Chlamydia/GC amplified DNA by PCR; Future    Elevated BP without diagnosis of hypertension  BP elevated in 160's.  Unclear etiology and may be related to anxiety.  This is his first elevated reading.  Continue to monitor BP at home  Follow up with PCP           Lymphadenopathy  Lymphadenopathy appears to have resolved on exam.  Continue to monitor for now  No further imaging is needed at this time             Patient was provided medication, adherence and prevention education.    History of Present Illness   Routine follow-up for HIV.  Patient claims 100% adherence with Biktarvy and Prezcobix.  He reports missing 3 doses due to pharmacy refill issues. Patient denies  "any notable side effects.  Overall the feeling well.  The patient denies any fever chills or sweats, denies any nausea vomiting or diarrhea, denies any cough or shortness of breath.    ROS: A complete review of systems are negative other than that noted in the HPI   Current Outpatient Medications on File Prior to Visit   Medication Sig Dispense Refill    buPROPion (WELLBUTRIN XL) 150 mg 24 hr tablet TAKE 1 TABLET BY MOUTH EVERY DAY 90 tablet 1    [DISCONTINUED] bictegravir-emtricitab-tenofovir alafenamide (BIKTARVY) -25 MG tablet Take 1 tablet by mouth daily with breakfast 30 tablet 0    [DISCONTINUED] Darunavir-Cobicistat 800-150 MG TABS Take 1 tablet by mouth in the morning 90 tablet 0     No current facility-administered medications on file prior to visit.         Objective   /77 (BP Location: Right arm, Patient Position: Sitting, Cuff Size: Standard)   Pulse 104   Temp (!) 97.2 °F (36.2 °C) (Tympanic)   Resp 18   Ht 5' 11\" (1.803 m)   Wt 74.2 kg (163 lb 9.6 oz)   SpO2 99%   BMI 22.82 kg/m²     General: Alert, interactive, appearing well, nontoxic, no acute distress.  Neck: Supple without lymphadenopathy, no thyromegaly or masses  Throat: Oropharynx moist without lesions.   Lungs: Clear to auscultation bilaterally; no wheezes, rhonchi or rales; respirations unlabored  Heart: RRR; no murmur, rub or gallop  Abdomen: Soft, non-tender, non-distended, positive bowel sounds.    Extremities: No clubbing, cyanosis or edema  Skin: No new rashes or lesions. No draining wounds noted.    Lab Results: I have personally reviewed pertinent labs.  Lab Results   Component Value Date    K 4.0 12/15/2024     12/15/2024    CO2 29 12/15/2024    BUN 7 12/15/2024    CREATININE 0.92 12/15/2024    GLUF 85 08/23/2024    CALCIUM 9.5 12/15/2024    AST 16 12/15/2024    ALT 13 12/15/2024    ALKPHOS 74 12/15/2024    EGFR 110 12/15/2024     Lab Results   Component Value Date    WBC 9.24 12/15/2024    HGB 14.8 12/15/2024 " "   HCT 44.6 12/15/2024    MCV 83 12/15/2024     12/15/2024     Lab Results   Component Value Date    HEPCAB Non-reactive 08/23/2024     Lab Results   Component Value Date    HAV Non-reactive 08/23/2024    HEPCAB Non-reactive 08/23/2024     Lab Results   Component Value Date    RPR Non-Reactive 12/15/2024     CD4 ABS   Date/Time Value Ref Range Status   12/15/2024 11:57 AM 1015 359 - 1519 /uL Final     No results found for: \"HIVRNAPCR\"  HIV-1 TARGET   Date/Time Value Ref Range Status   08/23/2024 09:11 AM Detected (A) Not Detected Final       Radiology Results Review : No pertinent imaging studies reviewed.      "

## 2024-12-19 NOTE — ASSESSMENT & PLAN NOTE
Feels good on Biktarvy and Prezcobix with pending VL.  VL at dx was 241,000  copies.  New dx back in 2024 and was on Descovy for PrEP when HIV test was positive.  He was started on Biktarvy and Prezcobix due to concern for resistance to Descovy.    He reports missing 3 doses in the month of October due to getting medication refilled from pharmacy.     Partner has been tested for HIV and is on PrEP for about 3 months.   He reports condom use with anal sex but not oral.  Education on the importance of using condoms for all sexually activities  Continue Biktarvy and Prezcobix for now since VL is pending and follow up in 3 months  If VL > 200 copies will get labs in 4 weeks and follow up in 6 weeks  HIV Genotype showed sensitivity to all.   Stressed the importance of 100% medication adherence  Monitor CD4. HIV RNA, CMP, and CBCD to monitor for any developing virologic response, treatment failure, or drug toxicities  Follow up with Dr. Ron or Dr. De Leon   No OI's or IRIS noted   Agreed to use [facility] for pharmacy  HIV Counseling:    Viral Load: pending.  Was 241,000 copies at dx     CD4 Count: 1015      Denies side effects.  Stressed the importance of adherence.  Continue follow up in the ID clinic with Dr. Ron or Dr. De Leon.    Reviewed the most recent labs, including the CD4 and viral load.  Discussed the risks and benefits of treatment options, instructions for management, importance of treatment adherence, and reduction of risk factors.  Educated on possible medication side effects.    Counseled on routes of HIV transmission, including the risk of  infection.  Emphasized that viral suppression is the best method to prevent HIV transmission.  At this time the pt denies the need for HIV testing of anyone in their life.    Total encounter time was greater than 35 minutes.  Greater than 20 minutes were spent on counseling and patient education.  Pt voices understanding and agreement with treatment plan.     Orders:    bictegravir-emtricitab-tenofovir alafenamide (BIKTARVY) -25 MG tablet; Take 1 tablet by mouth daily with breakfast    Darunavir-Cobicistat 800-150 MG TABS; Take 1 tablet by mouth in the morning

## 2024-12-19 NOTE — ASSESSMENT & PLAN NOTE
S/P treatment RPR 1:16d dils with bicillin X 2 doses.  RPR remains non reactive.   Continue to monitor RPR     Orders:    RPR-Syphilis Screening (Total Syphilis IGG/IGM); Future    Chlamydia/GC amplified DNA by PCR; Future

## 2024-12-19 NOTE — ASSESSMENT & PLAN NOTE
Feels good on Biktarvy and Prezcobix with pending VL.  VL at dx was 241,000  copies.  New dx back in 2024 and was on Descovy for PrEP when HIV test was positive.  He was started on Biktarvy and Prezcobix due to concern for resistance to Descovy.    He reports missing 3 doses in the month of October due to getting medication refilled from pharmacy.     Partner has been tested for HIV and is on PrEP for about 3 months.   He reports condom use with anal sex but not oral.  Education on the importance of using condoms for all sexually activities  Continue Biktarvy and Prezcobix for now since VL is pending and follow up in 3 months  If VL > 200 copies will get labs in 4 weeks and follow up in 6 weeks  HIV Genotype showed sensitivity to all.   Stressed the importance of 100% medication adherence  Monitor CD4. HIV RNA, CMP, and CBCD to monitor for any developing virologic response, treatment failure, or drug toxicities  Follow up with Dr. Ron or Dr. De Leon   No OI's or IRIS noted   HIV Counseling:    Viral Load: pending.  Was 241,000 copies at dx     CD4 Count: 1015      Denies side effects.  Stressed the importance of adherence.  Continue follow up in the ID clinic with Dr. Ron or Dr. De Leon.    Reviewed the most recent labs, including the CD4 and viral load.  Discussed the risks and benefits of treatment options, instructions for management, importance of treatment adherence, and reduction of risk factors.  Educated on possible medication side effects.    Counseled on routes of HIV transmission, including the risk of  infection.  Emphasized that viral suppression is the best method to prevent HIV transmission.  At this time the pt denies the need for HIV testing of anyone in their life.    Total encounter time was greater than 35 minutes.  Greater than 20 minutes were spent on counseling and patient education.  Pt voices understanding and agreement with treatment plan.

## 2024-12-20 ENCOUNTER — RESULTS FOLLOW-UP (OUTPATIENT)
Dept: SURGERY | Facility: CLINIC | Age: 29
End: 2024-12-20

## 2024-12-20 ENCOUNTER — DOCUMENTATION (OUTPATIENT)
Dept: SURGERY | Facility: CLINIC | Age: 29
End: 2024-12-20

## 2024-12-20 ENCOUNTER — OFFICE VISIT (OUTPATIENT)
Dept: SURGERY | Facility: CLINIC | Age: 29
End: 2024-12-20
Payer: COMMERCIAL

## 2024-12-20 ENCOUNTER — PATIENT OUTREACH (OUTPATIENT)
Dept: SURGERY | Facility: CLINIC | Age: 29
End: 2024-12-20

## 2024-12-20 VITALS
SYSTOLIC BLOOD PRESSURE: 164 MMHG | WEIGHT: 163.6 LBS | DIASTOLIC BLOOD PRESSURE: 77 MMHG | HEIGHT: 71 IN | OXYGEN SATURATION: 99 % | HEART RATE: 104 BPM | TEMPERATURE: 97.2 F | RESPIRATION RATE: 18 BRPM | BODY MASS INDEX: 22.9 KG/M2

## 2024-12-20 DIAGNOSIS — A53.9 SYPHILIS: Primary | ICD-10-CM

## 2024-12-20 DIAGNOSIS — Z23 NEED FOR INFLUENZA VACCINATION: ICD-10-CM

## 2024-12-20 DIAGNOSIS — Z20.2 CONTACT WITH AND (SUSPECTED) EXPOSURE TO INFECTIONS WITH A PREDOMINANTLY SEXUAL MODE OF TRANSMISSION: ICD-10-CM

## 2024-12-20 DIAGNOSIS — R03.0 ELEVATED BP WITHOUT DIAGNOSIS OF HYPERTENSION: ICD-10-CM

## 2024-12-20 DIAGNOSIS — B20 HIV DISEASE (HCC): Primary | ICD-10-CM

## 2024-12-20 DIAGNOSIS — Z77.21 PERSONAL HISTORY OF EXPOSURE TO POTENTIALLY HAZARDOUS BODY FLUIDS: ICD-10-CM

## 2024-12-20 DIAGNOSIS — R59.1 LYMPHADENOPATHY: ICD-10-CM

## 2024-12-20 DIAGNOSIS — Z11.3 ENCOUNTER FOR SPECIAL SCREENING EXAMINATION FOR INFECTION WITH PREDOMINANTLY SEXUAL MODE OF TRANSMISSION: ICD-10-CM

## 2024-12-20 DIAGNOSIS — A53.9 SYPHILIS: ICD-10-CM

## 2024-12-20 DIAGNOSIS — Z23 NEED FOR COVID-19 VACCINE: ICD-10-CM

## 2024-12-20 DIAGNOSIS — B20 HUMAN IMMUNODEFICIENCY VIRUS (HIV) DISEASE (HCC): Primary | ICD-10-CM

## 2024-12-20 LAB
HIV1 RNA # PLAS NAA DL=20: 213 CP/ML
HIV1 RNA # PLAS NAA DL=20: DETECTED {COPIES}/ML

## 2024-12-20 PROCEDURE — 99215 OFFICE O/P EST HI 40 MIN: CPT | Performed by: NURSE PRACTITIONER

## 2024-12-20 NOTE — PROGRESS NOTES
[facility] Annual Sexual Health Assessment     Adilson was seen by Prevention Nurse in conjunction with his ID visit today.      CD4 count:  1015  Viral load:  still pending from 12/18  ART:   Biktarvy    Explained to patient that we complete a sexual health assessment once yearly.   Adilson reports he was in a relationship that he now realizes was not healthy and that relationship has ended. He is now with a new partner and is monogamous with him and he feels like this stable relationship has really helped him to get more focused about his care. His partner is on PrEP and he reports they use condoms as well. He says he feels so much safer and more comfortable in current relationship.

## 2024-12-20 NOTE — PROGRESS NOTES
This cm reached out to ct to let him know she needed updated MA documents. Ct made aware this cm needed proof of his 401k, November bank statements, and his unemployment. This cm let ct know since his SPBP would  at the end of this month, he needed to complete a new application.     This cm met with ct after his appointment with the clinic. This cm reminded ct of documents needed to submit SPBP and MA application. Ct made aware SPBP takes about 3 days to process and would cover the cost of his medications and labs. Ct made aware MA takes about 30 days to process. Ct made aware he was previously approved for 90 days for SPBP due to being eligible for MA. Ct aware once his MA was submitted and approved, the SPBP would be terminated. Ct aware once this cm received requested documents, SPBP and MA application would be submitted. Ct verbalized understanding.     ROIs signed by ct for [facility] and scanned into ct's chart. Ct aware ROIs would also be submitted to the [facility] in order for this cm to follow up on MA application.     Ct discussion took place with Shelley LINDO and Makayla PHAM in regards to ct's visit with the clinic and medication refill.

## 2024-12-20 NOTE — ASSESSMENT & PLAN NOTE
BP elevated in 160's.  Unclear etiology and may be related to anxiety.  This is his first elevated reading.  Continue to monitor BP at home  Follow up with PCP

## 2024-12-20 NOTE — ASSESSMENT & PLAN NOTE
Lymphadenopathy appears to have resolved on exam.  Continue to monitor for now  No further imaging is needed at this time

## 2024-12-23 ENCOUNTER — TELEPHONE (OUTPATIENT)
Dept: SURGERY | Facility: CLINIC | Age: 29
End: 2024-12-23

## 2024-12-24 ENCOUNTER — DOCUMENTATION (OUTPATIENT)
Dept: SURGERY | Facility: CLINIC | Age: 29
End: 2024-12-24

## 2024-12-24 NOTE — PROGRESS NOTES
"[facility] Annual Nutrition Assessment    Adilson seen by RD in conjunction with PCP f/u     Adilson is establishing care at [place], newly diagnosed     PMHx:  HIV, lymphadenopathy, elevated BP without HTN, depression       Clinical Data/Client History    CD4 count:  1015  Viral load:  213  ART:   Biktarvy and Descovy      , Patient Navigator: Shazia   : N/A    Food assistance:  none reported     Living situation: House or apartment     Psychosocial factors: Depression     Mobility:  self ambulates    Physical activity: exercises 1 day a week for 45 minutes-working on heart health & circulation       Oral health concerns: denied oral health concerns       Typical food/beverage intake: eats out 1-2 times per week     Breakfast yogurt, banana or toast   Lunch soup, granola bar or sandwich & snack   Dinner protein(beef, chicken, fish), starch & vegetable   Snacks fruit, chips, pretzels  Beverages coffee, green tea, water     Appetite: Good    OTC vitamin, mineral, herbal supplements: probiotics     Oral/enteral nutrition supplements:  denies    GI problems: constipation    Food allergies/intolerances:  NKFA    Weight history:  Wt Readings from Last 3 Encounters:   12/20/24 74.2 kg (163 lb 9.6 oz)   09/10/24 71.2 kg (156 lb 15.5 oz)   08/23/24 71.2 kg (157 lb)       Current body weight:  163  Height:  71\"  BMI:  22.8  IBW:  158  %IBW  103%    Weight change: no significant weight change reported, +6# x 4 months.        Nutrition-related labs:    Lab Results   Component Value Date    HGBA1C 5.5 08/23/2024      Lab Results   Component Value Date    SODIUM 139 12/15/2024    K 4.0 12/15/2024     12/15/2024    CO2 29 12/15/2024    BUN 7 12/15/2024    CREATININE 0.92 12/15/2024    GLUC 89 12/15/2024    CALCIUM 9.5 12/15/2024      Lab Results   Component Value Date    CHOLESTEROL 127 08/23/2024     Lab Results   Component Value Date    HDL 40 08/23/2024     Lab Results   Component Value Date    TRIG 85 " 08/23/2024     Lab Results   Component Value Date    [facility] 87 08/23/2024        Current medications:     Current Outpatient Medications:     bictegravir-emtricitab-tenofovir alafenamide (BIKTARVY) -25 MG tablet, Take 1 tablet by mouth daily with breakfast, Disp: 90 tablet, Rfl: 0    buPROPion (WELLBUTRIN XL) 150 mg 24 hr tablet, TAKE 1 TABLET BY MOUTH EVERY DAY, Disp: 90 tablet, Rfl: 1    Darunavir-Cobicistat 800-150 MG TABS, Take 1 tablet by mouth in the morning, Disp: 90 tablet, Rfl: 0      Physical findings/skin integrity:  Skin intact       Nutrition Diagnosis    Problem: no nutrition diagnosis at this time      Estimated Nutritional Needs    [facility] Amrit REE: CBW     ~2220 kcal (based on: 1.3 stress factor)  ~59 protein (based on: .08)  ~2220 fluid (based on: 1ml/kcal/day)      Current intake estimation: meeting needs       Nutrition Intervention/Recommendations    Nutrition education intervention: deferred for future encounter       Goals:  -no SMART goals established at this time     Monitoring/Evaluation:  Will monitor weight trend, labs, need for nutrition education and food access    Visit Summary  Adilson's annual/initial nutrition assessment completed per the [facility] mansi requirements. Patients nutrition related labs & BMI are normal. Patient had no nutrition questions or concerns. Will discuss Myplate at next visit.   -Rain Fields RDN,LDN

## 2024-12-26 ENCOUNTER — TELEPHONE (OUTPATIENT)
Dept: SURGERY | Facility: CLINIC | Age: 29
End: 2024-12-26

## 2024-12-26 NOTE — TELEPHONE ENCOUNTER
LVM for a call back. Wanted to update him on results and let him patient know we changed his appointment to 1/24 at 8am and he needs to labs after 1/17.

## 2024-12-31 ENCOUNTER — TELEPHONE (OUTPATIENT)
Dept: SURGERY | Facility: CLINIC | Age: 29
End: 2024-12-31

## 2024-12-31 NOTE — TELEPHONE ENCOUNTER
Ciara from [facility] called letting me know they have not been able to contact patient therefore they have not been able to send out meds. I told them I haven't been able to contact him either but to try texing him.

## 2025-01-03 ENCOUNTER — DOCUMENTATION (OUTPATIENT)
Dept: SURGERY | Facility: CLINIC | Age: 30
End: 2025-01-03

## 2025-01-03 NOTE — PROGRESS NOTES
Data: Pt was called at 12:26 p.m. and texted at 12:28 p.m. to reschedule upcoming BH f/u session due to potential weather inclemency. VM was full at the time of contact.

## 2025-01-06 ENCOUNTER — DOCUMENTATION (OUTPATIENT)
Dept: SURGERY | Facility: CLINIC | Age: 30
End: 2025-01-06

## 2025-01-06 NOTE — PROGRESS NOTES
Data: Pt was contacted by [facility] within coordinated time, however, pt didn't respond to call, nor VM could be provided due to full voice mail. [facility] provided a text message to pt for a f/u at 2:02 p.m. A 15 minutes extension will be provided for pt to reach out to complete requested session.

## 2025-01-08 ENCOUNTER — APPOINTMENT (OUTPATIENT)
Dept: LAB | Facility: HOSPITAL | Age: 30
End: 2025-01-08

## 2025-01-08 ENCOUNTER — DOCUMENTATION (OUTPATIENT)
Dept: SURGERY | Facility: CLINIC | Age: 30
End: 2025-01-08

## 2025-01-08 DIAGNOSIS — Z23 NEED FOR INFLUENZA VACCINATION: ICD-10-CM

## 2025-01-08 DIAGNOSIS — A53.9 SYPHILIS: ICD-10-CM

## 2025-01-08 DIAGNOSIS — Z11.3 ENCOUNTER FOR SPECIAL SCREENING EXAMINATION FOR INFECTION WITH PREDOMINANTLY SEXUAL MODE OF TRANSMISSION: ICD-10-CM

## 2025-01-08 PROCEDURE — 87591 N.GONORRHOEAE DNA AMP PROB: CPT

## 2025-01-08 PROCEDURE — 87491 CHLMYD TRACH DNA AMP PROBE: CPT

## 2025-01-08 NOTE — PROGRESS NOTES
Patient called [facility] office to ask about STD testing. He explained his partner has Gonorrhea and is being treated now. Adilson asking if he can come to [facility] to get tested. Patient is ID only and clinic staff have been trying to reach him to let him know his appt needed to be moved up to 1/24/25.    He has GC/CT orders in his chart and he wanted to know if he can get those done today at [facility]. He is planning to go there now. After speaking with clinical lead we advised Adilson to ask lab to not draw his other labs because ID provider wanted him to wait 4 weeks before repeating labs. He understands and will only go GC/CT testing today. He confirmed he will keep ID appt for 1/24/25 at 8am with Dr. De Leon.     Patient aware that we will contact him when results are back.

## 2025-01-09 ENCOUNTER — RESULTS FOLLOW-UP (OUTPATIENT)
Dept: SURGERY | Facility: CLINIC | Age: 30
End: 2025-01-09

## 2025-01-09 ENCOUNTER — TELEPHONE (OUTPATIENT)
Dept: SURGERY | Facility: CLINIC | Age: 30
End: 2025-01-09

## 2025-01-09 LAB
C TRACH DNA SPEC QL NAA+PROBE: NEGATIVE
N GONORRHOEA DNA SPEC QL NAA+PROBE: POSITIVE

## 2025-01-13 ENCOUNTER — TELEPHONE (OUTPATIENT)
Dept: SURGERY | Facility: CLINIC | Age: 30
End: 2025-01-13

## 2025-01-14 DIAGNOSIS — A54.9 GONORRHEA: Primary | ICD-10-CM

## 2025-01-14 RX ORDER — LIDOCAINE HYDROCHLORIDE 10 MG/ML
1 INJECTION, SOLUTION EPIDURAL; INFILTRATION; INTRACAUDAL; PERINEURAL ONCE
Qty: 1 ML | Refills: 0 | Status: SHIPPED | OUTPATIENT
Start: 2025-01-14 | End: 2025-01-14

## 2025-01-14 RX ORDER — CEFTRIAXONE 500 MG/1
500 INJECTION, POWDER, FOR SOLUTION INTRAMUSCULAR; INTRAVENOUS ONCE
Qty: 1 EACH | Refills: 0 | Status: SHIPPED | OUTPATIENT
Start: 2025-01-14 | End: 2025-01-14

## 2025-01-15 ENCOUNTER — PATIENT OUTREACH (OUTPATIENT)
Dept: SURGERY | Facility: CLINIC | Age: 30
End: 2025-01-15

## 2025-01-15 ENCOUNTER — TELEPHONE (OUTPATIENT)
Dept: SURGERY | Facility: CLINIC | Age: 30
End: 2025-01-15

## 2025-01-15 NOTE — TELEPHONE ENCOUNTER
Spoke with Amie from [facility] and inquired about price on medctions needed to treat patient for gonorrhoea and she states is under $5 dollars. It won't be able to be shipped and arrive here by Friday, earliest it will arrive is on Tuesday.

## 2025-01-15 NOTE — PROGRESS NOTES
Received phone call from ccn in regards to ct's spbp  terming. CM called and left ct message to return call to complete spbp application

## 2025-01-23 ENCOUNTER — TELEPHONE (OUTPATIENT)
Dept: SURGERY | Facility: CLINIC | Age: 30
End: 2025-01-23

## 2025-01-23 NOTE — TELEPHONE ENCOUNTER
Spoke with Wong from [facility] and he ran a claim to make sure patients SPBP is active and it is.

## 2025-01-23 NOTE — TELEPHONE ENCOUNTER
Spoke with Wong from [facility]  and inquired about rocephin that we were awaiting on, he states it was shipped on the 21st but there was a weather delay. It will arrive tomorrow by 10:30am.

## 2025-01-24 ENCOUNTER — OFFICE VISIT (OUTPATIENT)
Dept: SURGERY | Facility: CLINIC | Age: 30
End: 2025-01-24
Payer: COMMERCIAL

## 2025-01-24 ENCOUNTER — APPOINTMENT (OUTPATIENT)
Dept: LAB | Facility: CLINIC | Age: 30
End: 2025-01-24
Payer: COMMERCIAL

## 2025-01-24 ENCOUNTER — TELEPHONE (OUTPATIENT)
Dept: SURGERY | Facility: CLINIC | Age: 30
End: 2025-01-24

## 2025-01-24 ENCOUNTER — CLINICAL SUPPORT (OUTPATIENT)
Dept: SURGERY | Facility: CLINIC | Age: 30
End: 2025-01-24
Payer: COMMERCIAL

## 2025-01-24 VITALS
SYSTOLIC BLOOD PRESSURE: 138 MMHG | DIASTOLIC BLOOD PRESSURE: 81 MMHG | HEIGHT: 70 IN | WEIGHT: 161.8 LBS | RESPIRATION RATE: 16 BRPM | OXYGEN SATURATION: 98 % | TEMPERATURE: 96.7 F | HEART RATE: 111 BPM | BODY MASS INDEX: 23.16 KG/M2

## 2025-01-24 DIAGNOSIS — B20 HUMAN IMMUNODEFICIENCY VIRUS (HIV) DISEASE (HCC): Primary | ICD-10-CM

## 2025-01-24 DIAGNOSIS — A54.9 GONORRHEA: Primary | ICD-10-CM

## 2025-01-24 DIAGNOSIS — B20 HUMAN IMMUNODEFICIENCY VIRUS (HIV) DISEASE (HCC): ICD-10-CM

## 2025-01-24 DIAGNOSIS — F33.1 MODERATE EPISODE OF RECURRENT MAJOR DEPRESSIVE DISORDER (HCC): ICD-10-CM

## 2025-01-24 DIAGNOSIS — L70.9 ACNE, UNSPECIFIED ACNE TYPE: ICD-10-CM

## 2025-01-24 DIAGNOSIS — Z11.1 SCREENING-PULMONARY TB: ICD-10-CM

## 2025-01-24 DIAGNOSIS — A53.9 SYPHILIS: ICD-10-CM

## 2025-01-24 DIAGNOSIS — R05.1 ACUTE COUGH: ICD-10-CM

## 2025-01-24 DIAGNOSIS — Z11.3 SCREENING EXAMINATION FOR STD (SEXUALLY TRANSMITTED DISEASE): ICD-10-CM

## 2025-01-24 LAB
ALBUMIN SERPL BCG-MCNC: 4.4 G/DL (ref 3.5–5)
ALP SERPL-CCNC: 68 U/L (ref 34–104)
ALT SERPL W P-5'-P-CCNC: 13 U/L (ref 7–52)
ANION GAP SERPL CALCULATED.3IONS-SCNC: 7 MMOL/L (ref 4–13)
AST SERPL W P-5'-P-CCNC: 14 U/L (ref 13–39)
BASOPHILS # BLD MANUAL: 0 THOUSAND/UL (ref 0–0.1)
BASOPHILS NFR MAR MANUAL: 0 % (ref 0–1)
BILIRUB SERPL-MCNC: 0.3 MG/DL (ref 0.2–1)
BUN SERPL-MCNC: 8 MG/DL (ref 5–25)
CALCIUM SERPL-MCNC: 9.6 MG/DL (ref 8.4–10.2)
CHLORIDE SERPL-SCNC: 105 MMOL/L (ref 96–108)
CO2 SERPL-SCNC: 29 MMOL/L (ref 21–32)
CREAT SERPL-MCNC: 0.98 MG/DL (ref 0.6–1.3)
EOSINOPHIL # BLD MANUAL: 0 THOUSAND/UL (ref 0–0.4)
EOSINOPHIL NFR BLD MANUAL: 0 % (ref 0–6)
ERYTHROCYTE [DISTWIDTH] IN BLOOD BY AUTOMATED COUNT: 14.1 % (ref 11.6–15.1)
GFR SERPL CREATININE-BSD FRML MDRD: 102 ML/MIN/1.73SQ M
GLUCOSE P FAST SERPL-MCNC: 80 MG/DL (ref 65–99)
HCT VFR BLD AUTO: 48.2 % (ref 36.5–49.3)
HGB BLD-MCNC: 15.2 G/DL (ref 12–17)
LYMPHOCYTES # BLD AUTO: 2.99 THOUSAND/UL (ref 0.6–4.47)
LYMPHOCYTES # BLD AUTO: 22 % (ref 14–44)
MCH RBC QN AUTO: 27.3 PG (ref 26.8–34.3)
MCHC RBC AUTO-ENTMCNC: 31.5 G/DL (ref 31.4–37.4)
MCV RBC AUTO: 87 FL (ref 82–98)
MONOCYTES # BLD AUTO: 0.64 THOUSAND/UL (ref 0–1.22)
MONOCYTES NFR BLD: 6 % (ref 4–12)
NEUTROPHILS # BLD MANUAL: 7.04 THOUSAND/UL (ref 1.85–7.62)
NEUTS SEG NFR BLD AUTO: 66 % (ref 43–75)
PLATELET # BLD AUTO: 370 THOUSANDS/UL (ref 149–390)
PLATELET BLD QL SMEAR: ADEQUATE
PLATELET CLUMP BLD QL SMEAR: PRESENT
PMV BLD AUTO: 9.9 FL (ref 8.9–12.7)
POTASSIUM SERPL-SCNC: 3.9 MMOL/L (ref 3.5–5.3)
PROT SERPL-MCNC: 7.9 G/DL (ref 6.4–8.4)
RBC # BLD AUTO: 5.56 MILLION/UL (ref 3.88–5.62)
RBC MORPH BLD: NORMAL
SODIUM SERPL-SCNC: 141 MMOL/L (ref 135–147)
VARIANT LYMPHS # BLD AUTO: 6 %
WBC # BLD AUTO: 10.67 THOUSAND/UL (ref 4.31–10.16)

## 2025-01-24 PROCEDURE — 86780 TREPONEMA PALLIDUM: CPT

## 2025-01-24 PROCEDURE — 86361 T CELL ABSOLUTE COUNT: CPT

## 2025-01-24 PROCEDURE — 80053 COMPREHEN METABOLIC PANEL: CPT

## 2025-01-24 PROCEDURE — 85027 COMPLETE CBC AUTOMATED: CPT

## 2025-01-24 PROCEDURE — 36415 COLL VENOUS BLD VENIPUNCTURE: CPT

## 2025-01-24 PROCEDURE — 87636 SARSCOV2 & INF A&B AMP PRB: CPT | Performed by: STUDENT IN AN ORGANIZED HEALTH CARE EDUCATION/TRAINING PROGRAM

## 2025-01-24 PROCEDURE — 87491 CHLMYD TRACH DNA AMP PROBE: CPT | Performed by: STUDENT IN AN ORGANIZED HEALTH CARE EDUCATION/TRAINING PROGRAM

## 2025-01-24 PROCEDURE — 86480 TB TEST CELL IMMUN MEASURE: CPT

## 2025-01-24 PROCEDURE — 87591 N.GONORRHOEAE DNA AMP PROB: CPT | Performed by: STUDENT IN AN ORGANIZED HEALTH CARE EDUCATION/TRAINING PROGRAM

## 2025-01-24 PROCEDURE — 86592 SYPHILIS TEST NON-TREP QUAL: CPT

## 2025-01-24 PROCEDURE — 87536 HIV-1 QUANT&REVRSE TRNSCRPJ: CPT

## 2025-01-24 PROCEDURE — 85007 BL SMEAR W/DIFF WBC COUNT: CPT

## 2025-01-24 PROCEDURE — 96372 THER/PROPH/DIAG INJ SC/IM: CPT

## 2025-01-24 PROCEDURE — 99211 OFF/OP EST MAY X REQ PHY/QHP: CPT

## 2025-01-24 PROCEDURE — 99215 OFFICE O/P EST HI 40 MIN: CPT | Performed by: STUDENT IN AN ORGANIZED HEALTH CARE EDUCATION/TRAINING PROGRAM

## 2025-01-24 RX ORDER — CEFTRIAXONE 500 MG/1
500 INJECTION, POWDER, FOR SOLUTION INTRAMUSCULAR; INTRAVENOUS ONCE
Status: COMPLETED | OUTPATIENT
Start: 2025-01-24 | End: 2025-01-24

## 2025-01-24 RX ADMIN — CEFTRIAXONE 500 MG: 500 INJECTION, POWDER, FOR SOLUTION INTRAMUSCULAR; INTRAVENOUS at 11:25

## 2025-01-24 NOTE — PROGRESS NOTES
Name: Donavan Miller      : 1995      MRN: [id number]  Encounter Provider: Kenzie De Leon MD  Encounter Date: 2025   Encounter department: [facility] AT [facility]  :  Assessment & Plan  Human immunodeficiency virus (HIV) disease (HCC)  New diagnosis 24.  Patient previously on PrEP 2023 to 2024 with Descovy through a virtual online service.  HIV testing performed for patient to restart PrEP and was positive. Patient MSM, HIV acquired through sexual contact.  Patient reports multiple sexual partners in the last 6 months.  Started on Biktarvy and Prezcobix due to prior PrEP use.   viral load 213 copies down from 241K and CD4 1015  -Continue Biktarvy and Prezcobix  -Recheck labs today.  If viral load undetectable will discontinue Prezcobix  -Patient was provided medication, adherence and prevention education  -Recheck labs in 2 months to assess for virologic control, coinfection's, drug toxicity  -Follow-up in 3 months  Syphilis  History of secondary syphilis 2024. RPR 1:16 dil 1/15/2024. Status post 2 doses of IM benzathine penicillin G.  RPR remains nonreactive.   -Repeat RPR every 3 months  Gonorrhea  Partner tested positive so the patient pursued testing and was also positive on 2024. Chlamydia negative.   -500 mg IM Ceftriaxone.  Will be delivered to office today for administration   -Recheck GC/CT in 3 months   -discuss doxy PEP next visit if any new partners  Moderate episode of recurrent major depressive disorder (HCC)  On wellbutrin per PCP.  Mood is stable today  Acne, unspecified acne type  Patient with nodular cystic acne.  Using benzyl peroxide wash.  Requesting dermatology referral  Orders:    Ambulatory Referral to Dermatology; Future    Acute cough  Likely a viral syndrome.  COVID and flu swab performed  Orders:    Covid/Flu- Office Collect Normal; Future    Screening examination for STD (sexually transmitted disease)  Patient requesting rectal  and pharyngeal gonorrhea and Chlamydia testing.  Testing performed today.  Orders:    MISCELLANEOUS LAB TEST; Future    MISCELLANEOUS LAB TEST; Future      History of Present Illness   Routine follow-up for HIV.  He was seen as a new patient visit 8/2024. Patient claims 100% adherence with Biktarvy and Prezcobix in the last few weeks. Patient denies any notable side effects.  The patient denies any fever chills or sweats, denies any nausea, vomiting. He has had some recent loose stools. The patient's partner recently tested positive for gonorrhea and chlamydia and the patient also tested positive for gonorrhea 1/8. His partner was treated for both.  The patient endorses this has been his only partner over the last several months.  Office is awaiting ceftriaxone shipment for treatment. Last VL 12/18 was 213 copies.    He reports development of an illness on Sunday with nasal congestion, cough, chest congestion.  He denies a sore throat.  The patient feels that he is improving.    ROS: A complete review of systems are negative other than that noted in the HPI   Medical History Reviewed by provider this encounter:     PMH, allergies, medications     Objective   There were no vitals taken for this visit.    General: Alert, interactive, appearing well, nontoxic, no acute distress.  Neck: Supple without lymphadenopathy, no thyromegaly or masses  Throat: Oropharynx moist without lesions.  No erythema  Lungs: Clear to auscultation bilaterally; no wheezes, rhonchi or rales; respirations unlabored  Heart: RRR; no murmur, rub or gallop  Abdomen: Soft, non-tender, non-distended, positive bowel sounds.    Extremities: No clubbing, cyanosis or edema  Skin: Cystic acne present on the patient's chest and face    Lab Results: I have personally reviewed pertinent labs.  Lab Results   Component Value Date    K 4.0 12/15/2024     12/15/2024    CO2 29 12/15/2024    BUN 7 12/15/2024    CREATININE 0.92 12/15/2024    GLUF 85  "08/23/2024    CALCIUM 9.5 12/15/2024    AST 16 12/15/2024    ALT 13 12/15/2024    ALKPHOS 74 12/15/2024    EGFR 110 12/15/2024     Lab Results   Component Value Date    WBC 9.24 12/15/2024    HGB 14.8 12/15/2024    HCT 44.6 12/15/2024    MCV 83 12/15/2024     12/15/2024     Lab Results   Component Value Date    HEPCAB Non-reactive 08/23/2024     Lab Results   Component Value Date    HAV Non-reactive 08/23/2024    HEPCAB Non-reactive 08/23/2024     Lab Results   Component Value Date    RPR Non-Reactive 12/15/2024     CD4 ABS   Date/Time Value Ref Range Status   12/15/2024 11:57 AM 1015 359 - 1519 /uL Final     No results found for: \"HIVRNAPCR\"  HIV-1 TARGET   Date/Time Value Ref Range Status   12/18/2024 04:59 PM Detected (A) Not Detected Final       "

## 2025-01-24 NOTE — TELEPHONE ENCOUNTER
Spoke with Amie from [facility] and confirmed patients med will be arriving at 10:30am today.

## 2025-01-24 NOTE — PROGRESS NOTES
Patient presented to clinic as scheduled. SHANIKA Tracy MA, I administered Inramuscular injection without noted complications to the Left Upper Outer Quadrant. Patient watched for 15 minutes as per recommended guidelines. VIS Provided.        Reviewed instructions for injection site:  - Ok to use tylenol for site pain.  - Continue with daily activities, stay active.  - Do not massage injection sites.   - May use ice or heat for 10 minutes QID.  - Call office with any questions or concerns.  - Reviewed instructions to stay active, do not massage muscle, may use tylenol for site pain, or ice or heat for comfort.   - Questions asked and answered.

## 2025-01-24 NOTE — ASSESSMENT & PLAN NOTE
Partner tested positive so the patient pursued testing and was also positive on 1/8/2024. Chlamydia negative.   -500 mg IM Ceftriaxone.  Will be delivered to office today for administration   -Recheck GC/CT in 3 months   -discuss doxy PEP next visit if any new partners

## 2025-01-24 NOTE — ASSESSMENT & PLAN NOTE
History of secondary syphilis January 2024. RPR 1:16 dil 1/15/2024. Status post 2 doses of IM benzathine penicillin G.  RPR remains nonreactive.   -Repeat RPR every 3 months

## 2025-01-25 LAB
FLUAV RNA RESP QL NAA+PROBE: NEGATIVE
FLUBV RNA RESP QL NAA+PROBE: NEGATIVE
GAMMA INTERFERON BACKGROUND BLD IA-ACNC: 0.29 IU/ML
M TB IFN-G BLD-IMP: NEGATIVE
M TB IFN-G CD4+ BCKGRND COR BLD-ACNC: 0.05 IU/ML
M TB IFN-G CD4+ BCKGRND COR BLD-ACNC: 0.06 IU/ML
MITOGEN IGNF BCKGRD COR BLD-ACNC: 9.71 IU/ML
SARS-COV-2 RNA RESP QL NAA+PROBE: NEGATIVE

## 2025-01-26 LAB
BASOPHILS # BLD AUTO: 0.1 X10E3/UL (ref 0–0.2)
BASOPHILS NFR BLD AUTO: 1 %
CD3+CD4+ CELLS # BLD: 881 /UL (ref 359–1519)
CD3+CD4+ CELLS NFR BLD: 25.9 % (ref 30.8–58.5)
EOSINOPHIL # BLD AUTO: 0.1 X10E3/UL (ref 0–0.4)
EOSINOPHIL NFR BLD AUTO: 1 %
ERYTHROCYTE [DISTWIDTH] IN BLOOD BY AUTOMATED COUNT: 13.4 % (ref 11.6–15.4)
HCT VFR BLD AUTO: 43.8 % (ref 37.5–51)
HGB BLD-MCNC: 13.7 G/DL (ref 13–17.7)
IMM GRANULOCYTES # BLD: 0 X10E3/UL (ref 0–0.1)
IMM GRANULOCYTES NFR BLD: 0 %
LYMPHOCYTES # BLD AUTO: 3.4 X10E3/UL (ref 0.7–3.1)
LYMPHOCYTES NFR BLD AUTO: 32 %
MCH RBC QN AUTO: 27.5 PG (ref 26.6–33)
MCHC RBC AUTO-ENTMCNC: 31.3 G/DL (ref 31.5–35.7)
MCV RBC AUTO: 88 FL (ref 79–97)
MONOCYTES # BLD AUTO: 1.2 X10E3/UL (ref 0.1–0.9)
MONOCYTES NFR BLD AUTO: 11 %
MORPHOLOGY BLD-IMP: ABNORMAL
NEUTROPHILS # BLD AUTO: 5.8 X10E3/UL (ref 1.4–7)
NEUTROPHILS NFR BLD AUTO: 55 %
PLATELET # BLD AUTO: 412 X10E3/UL (ref 150–450)
RBC # BLD AUTO: 4.98 X10E6/UL (ref 4.14–5.8)
TREPONEMA PALLIDUM IGG+IGM AB [PRESENCE] IN SERUM OR PLASMA BY IMMUNOASSAY: REACTIVE
WBC # BLD AUTO: 10.6 X10E3/UL (ref 3.4–10.8)

## 2025-01-27 ENCOUNTER — PATIENT OUTREACH (OUTPATIENT)
Dept: SURGERY | Facility: CLINIC | Age: 30
End: 2025-01-27

## 2025-01-27 DIAGNOSIS — R03.0 ELEVATED BP WITHOUT DIAGNOSIS OF HYPERTENSION: ICD-10-CM

## 2025-01-27 DIAGNOSIS — B20 HUMAN IMMUNODEFICIENCY VIRUS (HIV) DISEASE (HCC): Primary | ICD-10-CM

## 2025-01-27 LAB
HIV1 RNA # PLAS NAA DL=20: 150 CP/ML
HIV1 RNA # PLAS NAA DL=20: DETECTED {COPIES}/ML
RPR SER QL: NORMAL

## 2025-01-28 ENCOUNTER — PATIENT OUTREACH (OUTPATIENT)
Dept: SURGERY | Facility: CLINIC | Age: 30
End: 2025-01-28

## 2025-01-28 NOTE — PROGRESS NOTES
Ct let this cm know he would send this cm the requested documents some time today.     MA documents were received. MA application was completed and faxed and email to [facility] Assistance office. Application was scanned into ct's chart with fax confirmation sheet.

## 2025-01-29 LAB — T PALLIDUM AB SER QL AGGL: REACTIVE

## 2025-01-30 ENCOUNTER — TELEPHONE (OUTPATIENT)
Dept: SURGERY | Facility: CLINIC | Age: 30
End: 2025-01-30

## 2025-01-30 ENCOUNTER — RESULTS FOLLOW-UP (OUTPATIENT)
Dept: OTHER | Facility: HOSPITAL | Age: 30
End: 2025-01-30

## 2025-01-30 DIAGNOSIS — A74.9 CHLAMYDIA: Primary | ICD-10-CM

## 2025-01-30 LAB
MISCELLANEOUS LAB TEST RESULT: NORMAL
MISCELLANEOUS LAB TEST RESULT: NORMAL

## 2025-01-30 RX ORDER — DOXYCYCLINE 100 MG/1
100 TABLET ORAL 2 TIMES DAILY
Qty: 14 TABLET | Refills: 0 | Status: SHIPPED | OUTPATIENT
Start: 2025-01-30 | End: 2025-02-06

## 2025-01-30 NOTE — TELEPHONE ENCOUNTER
Spoke with patient and let him know that as per Dr. De Leon his rectal gonorrhea and chlamydia are positive and pharyngeal chlamydia is also positive.  He already received the ceftriaxone for gonorrhea on 1/24/25. She sent a 7-day course of doxycycline to [facility]. I also let him know his COVID test was negative.

## 2025-01-30 NOTE — TELEPHONE ENCOUNTER
Spoke with patient and let him know as per Dr. De Leon dxycycline is the recommend treatment for chlamydia so she would not recommend an alternative.  He should be advised to take the doxycycline with a full glass of water and sit upright for 30 minutes after in order to avoid reflux symptoms.  It is best taken on an empty stomach but that can cause more stomach upset so he can take the doxycycline with a light meal like toast or crackers.  If it is more nausea that he feels she can send some Zofran to take while he is on the doxycycline.  If he gets more reflux or epigastric pain, can take with Pepcid although he should take the doxycycline 1 hour before or 2 hours after the doxycycline to avoid interaction. Patient verbalizes understaning, no questions or concerns. He states he will reach out if he needs nausea medication.

## 2025-02-06 ENCOUNTER — PATIENT OUTREACH (OUTPATIENT)
Dept: SURGERY | Facility: CLINIC | Age: 30
End: 2025-02-06

## 2025-02-06 NOTE — PROGRESS NOTES
This cm let ct know his MA application was pending. Ct was made aware the application was submitted to the [facility] back on 1/27/2025. Ct aware this cm would follow up with the county later this month to see if he was approved.

## 2025-02-27 ENCOUNTER — PATIENT OUTREACH (OUTPATIENT)
Dept: SURGERY | Facility: CLINIC | Age: 30
End: 2025-02-27

## 2025-03-05 DIAGNOSIS — B20 HIV DISEASE (HCC): ICD-10-CM

## 2025-03-05 RX ORDER — BICTEGRAVIR SODIUM, EMTRICITABINE, AND TENOFOVIR ALAFENAMIDE FUMARATE 50; 200; 25 MG/1; MG/1; MG/1
1 TABLET ORAL
Qty: 90 TABLET | Refills: 0 | Status: SHIPPED | OUTPATIENT
Start: 2025-03-05

## 2025-03-19 ENCOUNTER — PATIENT OUTREACH (OUTPATIENT)
Dept: SURGERY | Facility: CLINIC | Age: 30
End: 2025-03-19

## 2025-03-19 NOTE — PROGRESS NOTES
Ct discussion took place with clinical team in regards to ct being denied MA & this cm attempting to reach out to ct to come in to complete a MAWD application.   No

## 2025-04-02 ENCOUNTER — TELEPHONE (OUTPATIENT)
Dept: SURGERY | Facility: CLINIC | Age: 30
End: 2025-04-02

## 2025-04-02 DIAGNOSIS — B20 HIV DISEASE (HCC): ICD-10-CM

## 2025-04-07 ENCOUNTER — APPOINTMENT (OUTPATIENT)
Dept: LAB | Facility: HOSPITAL | Age: 30
End: 2025-04-07
Payer: COMMERCIAL

## 2025-04-07 ENCOUNTER — TELEPHONE (OUTPATIENT)
Dept: SURGERY | Facility: CLINIC | Age: 30
End: 2025-04-07

## 2025-04-07 DIAGNOSIS — A54.9 GONORRHEA: ICD-10-CM

## 2025-04-07 DIAGNOSIS — B20 HUMAN IMMUNODEFICIENCY VIRUS (HIV) DISEASE (HCC): ICD-10-CM

## 2025-04-07 DIAGNOSIS — R03.0 ELEVATED BP WITHOUT DIAGNOSIS OF HYPERTENSION: ICD-10-CM

## 2025-04-07 LAB
ALBUMIN SERPL BCG-MCNC: 4.5 G/DL (ref 3.5–5)
ALP SERPL-CCNC: 48 U/L (ref 34–104)
ALT SERPL W P-5'-P-CCNC: 17 U/L (ref 7–52)
ANION GAP SERPL CALCULATED.3IONS-SCNC: 5 MMOL/L (ref 4–13)
AST SERPL W P-5'-P-CCNC: 18 U/L (ref 13–39)
BASOPHILS # BLD AUTO: 0.05 THOUSANDS/ÂΜL (ref 0–0.1)
BASOPHILS NFR BLD AUTO: 1 % (ref 0–1)
BILIRUB SERPL-MCNC: 0.77 MG/DL (ref 0.2–1)
BUN SERPL-MCNC: 13 MG/DL (ref 5–25)
CALCIUM SERPL-MCNC: 9.4 MG/DL (ref 8.4–10.2)
CHLORIDE SERPL-SCNC: 103 MMOL/L (ref 96–108)
CO2 SERPL-SCNC: 28 MMOL/L (ref 21–32)
CREAT SERPL-MCNC: 1.02 MG/DL (ref 0.6–1.3)
EOSINOPHIL # BLD AUTO: 0.33 THOUSAND/ÂΜL (ref 0–0.61)
EOSINOPHIL NFR BLD AUTO: 6 % (ref 0–6)
ERYTHROCYTE [DISTWIDTH] IN BLOOD BY AUTOMATED COUNT: 13.9 % (ref 11.6–15.1)
GFR SERPL CREATININE-BSD FRML MDRD: 97 ML/MIN/1.73SQ M
GLUCOSE SERPL-MCNC: 89 MG/DL (ref 65–140)
HCT VFR BLD AUTO: 45.4 % (ref 36.5–49.3)
HGB BLD-MCNC: 14.7 G/DL (ref 12–17)
IMM GRANULOCYTES # BLD AUTO: 0.01 THOUSAND/UL (ref 0–0.2)
IMM GRANULOCYTES NFR BLD AUTO: 0 % (ref 0–2)
LYMPHOCYTES # BLD AUTO: 3.22 THOUSANDS/ÂΜL (ref 0.6–4.47)
LYMPHOCYTES NFR BLD AUTO: 55 % (ref 14–44)
MCH RBC QN AUTO: 29 PG (ref 26.8–34.3)
MCHC RBC AUTO-ENTMCNC: 32.4 G/DL (ref 31.4–37.4)
MCV RBC AUTO: 90 FL (ref 82–98)
MONOCYTES # BLD AUTO: 0.48 THOUSAND/ÂΜL (ref 0.17–1.22)
MONOCYTES NFR BLD AUTO: 8 % (ref 4–12)
NEUTROPHILS # BLD AUTO: 1.75 THOUSANDS/ÂΜL (ref 1.85–7.62)
NEUTS SEG NFR BLD AUTO: 30 % (ref 43–75)
NRBC BLD AUTO-RTO: 0 /100 WBCS
PLATELET # BLD AUTO: 277 THOUSANDS/UL (ref 149–390)
PMV BLD AUTO: 9.9 FL (ref 8.9–12.7)
POTASSIUM SERPL-SCNC: 4 MMOL/L (ref 3.5–5.3)
PROT SERPL-MCNC: 7.2 G/DL (ref 6.4–8.4)
RBC # BLD AUTO: 5.07 MILLION/UL (ref 3.88–5.62)
SODIUM SERPL-SCNC: 136 MMOL/L (ref 135–147)
WBC # BLD AUTO: 5.84 THOUSAND/UL (ref 4.31–10.16)

## 2025-04-07 PROCEDURE — 85025 COMPLETE CBC W/AUTO DIFF WBC: CPT

## 2025-04-07 PROCEDURE — 86361 T CELL ABSOLUTE COUNT: CPT

## 2025-04-07 PROCEDURE — 36415 COLL VENOUS BLD VENIPUNCTURE: CPT

## 2025-04-07 PROCEDURE — 80053 COMPREHEN METABOLIC PANEL: CPT

## 2025-04-07 NOTE — TELEPHONE ENCOUNTER
Spoke with patient and let him know his HIV-rna looks like they were not done because they are not in process. He did say 4 labs were drawn so it might possibly be that they haven't ran it yet. They also did not collect his urine in which he confirmed. He states he will check his mychart later and will go back later on today or tomorrow to get labs re done.

## 2025-04-08 LAB
BASOPHILS # BLD AUTO: 0.1 X10E3/UL (ref 0–0.2)
BASOPHILS NFR BLD AUTO: 1 %
CD3+CD4+ CELLS # BLD: 909 /UL (ref 359–1519)
CD3+CD4+ CELLS NFR BLD: 28.4 % (ref 30.8–58.5)
EOSINOPHIL # BLD AUTO: 0.3 X10E3/UL (ref 0–0.4)
EOSINOPHIL NFR BLD AUTO: 6 %
ERYTHROCYTE [DISTWIDTH] IN BLOOD BY AUTOMATED COUNT: 13.5 % (ref 11.6–15.4)
HCT VFR BLD AUTO: 44.2 % (ref 37.5–51)
HGB BLD-MCNC: 14.5 G/DL (ref 13–17.7)
IMM GRANULOCYTES # BLD: 0 X10E3/UL (ref 0–0.1)
IMM GRANULOCYTES NFR BLD: 0 %
LYMPHOCYTES # BLD AUTO: 3.2 X10E3/UL (ref 0.7–3.1)
LYMPHOCYTES NFR BLD AUTO: 54 %
MCH RBC QN AUTO: 28.8 PG (ref 26.6–33)
MCHC RBC AUTO-ENTMCNC: 32.8 G/DL (ref 31.5–35.7)
MCV RBC AUTO: 88 FL (ref 79–97)
MONOCYTES # BLD AUTO: 0.5 X10E3/UL (ref 0.1–0.9)
MONOCYTES NFR BLD AUTO: 9 %
NEUTROPHILS # BLD AUTO: 1.8 X10E3/UL (ref 1.4–7)
NEUTROPHILS NFR BLD AUTO: 30 %
PLATELET # BLD AUTO: 272 X10E3/UL (ref 150–450)
RBC # BLD AUTO: 5.04 X10E6/UL (ref 4.14–5.8)
WBC # BLD AUTO: 5.9 X10E3/UL (ref 3.4–10.8)

## 2025-04-09 ENCOUNTER — APPOINTMENT (OUTPATIENT)
Dept: LAB | Facility: HOSPITAL | Age: 30
End: 2025-04-09
Payer: COMMERCIAL

## 2025-04-09 ENCOUNTER — PATIENT OUTREACH (OUTPATIENT)
Dept: SURGERY | Facility: CLINIC | Age: 30
End: 2025-04-09

## 2025-04-09 DIAGNOSIS — Z20.2 CONTACT WITH AND (SUSPECTED) EXPOSURE TO INFECTIONS WITH A PREDOMINANTLY SEXUAL MODE OF TRANSMISSION: ICD-10-CM

## 2025-04-09 DIAGNOSIS — D72.89 OTHER SPECIFIED DISORDERS OF WHITE BLOOD CELLS: ICD-10-CM

## 2025-04-09 DIAGNOSIS — B20 HUMAN IMMUNODEFICIENCY VIRUS (HIV) DISEASE (HCC): Primary | ICD-10-CM

## 2025-04-09 DIAGNOSIS — Z11.3 ENCOUNTER FOR SCREENING FOR BACTERIAL SEXUALLY TRANSMITTED DISEASE: ICD-10-CM

## 2025-04-09 DIAGNOSIS — Z72.89 OTHER PROBLEMS RELATED TO LIFESTYLE: ICD-10-CM

## 2025-04-09 PROCEDURE — 87491 CHLMYD TRACH DNA AMP PROBE: CPT

## 2025-04-09 PROCEDURE — 87536 HIV-1 QUANT&REVRSE TRNSCRPJ: CPT

## 2025-04-09 PROCEDURE — 36415 COLL VENOUS BLD VENIPUNCTURE: CPT

## 2025-04-09 PROCEDURE — 87591 N.GONORRHOEAE DNA AMP PROB: CPT

## 2025-04-09 NOTE — PROGRESS NOTES
This cm received a phone all from Ciara form [facility].    This cm returned Ciara's phone call. Message was left.    This cm spoke to Ciara from [facility]. Ciara aware ct's SPBP  back in 2024. Ciara aware ct was denied MA and this cm has offered for him to complete a MAWD application. Ciara aware ct has not responded back.

## 2025-04-10 ENCOUNTER — PATIENT OUTREACH (OUTPATIENT)
Dept: SURGERY | Facility: CLINIC | Age: 30
End: 2025-04-10

## 2025-04-10 LAB
C TRACH DNA SPEC QL NAA+PROBE: NEGATIVE
N GONORRHOEA DNA SPEC QL NAA+PROBE: NEGATIVE

## 2025-04-10 NOTE — PROGRESS NOTES
Ct reached out to this cm requesting to meet with this cm tomorrow after his appointment with the clinic to complete a MAWD application. Ct let this cm know his SPBP coverage also . This cm let ct know this cm needed  bank statements, and updated information on his 401k & car. Ct aware this cm already had his income information. This cm let ct know she had all the documents she needed to complete an SPBP application.

## 2025-04-11 ENCOUNTER — PATIENT OUTREACH (OUTPATIENT)
Dept: SURGERY | Facility: CLINIC | Age: 30
End: 2025-04-11

## 2025-04-11 ENCOUNTER — TREATMENT (OUTPATIENT)
Dept: SURGERY | Facility: CLINIC | Age: 30
End: 2025-04-11

## 2025-04-11 ENCOUNTER — OFFICE VISIT (OUTPATIENT)
Dept: SURGERY | Facility: CLINIC | Age: 30
End: 2025-04-11
Payer: COMMERCIAL

## 2025-04-11 VITALS
HEIGHT: 70 IN | SYSTOLIC BLOOD PRESSURE: 127 MMHG | WEIGHT: 174.4 LBS | TEMPERATURE: 97 F | RESPIRATION RATE: 18 BRPM | HEART RATE: 88 BPM | OXYGEN SATURATION: 100 % | BODY MASS INDEX: 24.97 KG/M2 | DIASTOLIC BLOOD PRESSURE: 75 MMHG

## 2025-04-11 DIAGNOSIS — A74.9 CHLAMYDIA: ICD-10-CM

## 2025-04-11 DIAGNOSIS — Z23 NEED FOR TDAP VACCINATION: ICD-10-CM

## 2025-04-11 DIAGNOSIS — A54.9 GONORRHEA: ICD-10-CM

## 2025-04-11 DIAGNOSIS — F32.A DEPRESSION, UNSPECIFIED DEPRESSION TYPE: Primary | ICD-10-CM

## 2025-04-11 DIAGNOSIS — Z23 NEED FOR HEPATITIS A AND B VACCINATION: ICD-10-CM

## 2025-04-11 DIAGNOSIS — B20 HUMAN IMMUNODEFICIENCY VIRUS (HIV) DISEASE (HCC): Primary | ICD-10-CM

## 2025-04-11 DIAGNOSIS — F33.1 MODERATE EPISODE OF RECURRENT MAJOR DEPRESSIVE DISORDER (HCC): ICD-10-CM

## 2025-04-11 DIAGNOSIS — Z11.3 SCREENING EXAMINATION FOR STD (SEXUALLY TRANSMITTED DISEASE): ICD-10-CM

## 2025-04-11 DIAGNOSIS — A53.9 SYPHILIS: ICD-10-CM

## 2025-04-11 LAB
HIV1 RNA # PLAS NAA DL=20: 35.3 CP/ML
HIV1 RNA # PLAS NAA DL=20: DETECTED {COPIES}/ML

## 2025-04-11 PROCEDURE — 36415 COLL VENOUS BLD VENIPUNCTURE: CPT | Performed by: STUDENT IN AN ORGANIZED HEALTH CARE EDUCATION/TRAINING PROGRAM

## 2025-04-11 PROCEDURE — 90636 HEP A/HEP B VACC ADULT IM: CPT

## 2025-04-11 PROCEDURE — 87491 CHLMYD TRACH DNA AMP PROBE: CPT | Performed by: STUDENT IN AN ORGANIZED HEALTH CARE EDUCATION/TRAINING PROGRAM

## 2025-04-11 PROCEDURE — PBNCHG PB NO CHARGE PLACEHOLDER

## 2025-04-11 PROCEDURE — 99215 OFFICE O/P EST HI 40 MIN: CPT | Performed by: STUDENT IN AN ORGANIZED HEALTH CARE EDUCATION/TRAINING PROGRAM

## 2025-04-11 PROCEDURE — 90472 IMMUNIZATION ADMIN EACH ADD: CPT

## 2025-04-11 PROCEDURE — 90471 IMMUNIZATION ADMIN: CPT

## 2025-04-11 PROCEDURE — 87591 N.GONORRHOEAE DNA AMP PROB: CPT | Performed by: STUDENT IN AN ORGANIZED HEALTH CARE EDUCATION/TRAINING PROGRAM

## 2025-04-11 PROCEDURE — 90715 TDAP VACCINE 7 YRS/> IM: CPT

## 2025-04-11 NOTE — ASSESSMENT & PLAN NOTE
Partner tested positive in January so patient was also tested.  Urine and rectal gonorrhea testing was positive.  Patient treated with one-time dose of IM ceftriaxone 1/24/25.  He is sexually active with the same partner, denies any other new partners.  Urine GC/CT negative   - Recheck rectal and pharyngeal GC/CT for GARRY  Orders:    MISCELLANEOUS LAB TEST; Future    MISCELLANEOUS LAB TEST; Future

## 2025-04-11 NOTE — PROGRESS NOTES
Name: Donavan Miller      : 1995      MRN: [id number]  Encounter Provider: Kenzie De Leon MD  Encounter Date: 2025   Encounter department: [facility]  :  Assessment & Plan  Human immunodeficiency virus (HIV) disease (HCC)  New diagnosis 24.  Patient previously on PrEP 2023 to 2024 with Descovy through a virtual online service.  HIV testing performed for patient to restart PrEP and was positive. Patient MSM, HIV acquired through sexual contact.  Started on Biktarvy and Prezcobix due to prior PrEP use 2024.  Viral load now down to 35 copies and CD4 909.  Genotype without resistance  -Continue Biktarvy   -Will discontinue Prezcobix now that viral load is near undetectable and genotype without resistance  -Patient was provided medication, adherence and prevention education  -Recheck HIV viral load 4 weeks after discontinuation of Prezcobix and full labs in 2 months to assess for virologic control, coinfection's, drug toxicity  -Follow-up in 3 months  Orders:    HEPATITIS A HEPATITIS B COMBINED VACCINE IM    TDAP VACCINE GREATER THAN OR EQUAL TO 6YO IM    HIV-1 RNA, quantitative, PCR; Future    Need for Tdap vaccination  Vaccination provided today  Orders:    TDAP VACCINE GREATER THAN OR EQUAL TO 6YO IM    Need for hepatitis A and B vaccination  Vaccination provided today  Orders:    HEPATITIS A HEPATITIS B COMBINED VACCINE IM    Gonorrhea  Partner tested positive in January so patient was also tested.  Urine and rectal gonorrhea testing was positive.  Patient treated with one-time dose of IM ceftriaxone 25.  He is sexually active with the same partner, denies any other new partners.  Urine GC/CT negative   - Recheck rectal and pharyngeal GC/CT for GARRY  Orders:    MISCELLANEOUS LAB TEST; Future    MISCELLANEOUS LAB TEST; Future    Chlamydia   rectal and pharyngeal chlamydia were positive.  Patient treated with 7-day course of doxycycline.  Urine testing  "negative   - Repeat rectal and pharyngeal GC/CT for GARRY   - Discussed Doxy PEP if patient has any new sexual partners  Orders:    MISCELLANEOUS LAB TEST; Future    MISCELLANEOUS LAB TEST; Future    Screening examination for STD (sexually transmitted disease)    Orders:    MISCELLANEOUS LAB TEST; Future    MISCELLANEOUS LAB TEST; Future    Syphilis  History of secondary syphilis January 2024. RPR 1:16 dil 1/15/2024. Status post 2 doses of IM benzathine penicillin G.  RPR remains nonreactive.               -Repeat RPR every 6 months    Moderate episode of recurrent major depressive disorder (HCC)  Mood stable on Wellbutrin      History of Present Illness   Routine follow-up for HIV.  Patient claims 100% adherence with Biktarvy and Prezcobix. Patient denies any notable side effects.  Overall he is feeling well.  The patient denies any fever chills or sweats, denies any nausea vomiting or diarrhea, denies any cough or shortness of breath.  In January the patient was treated with IM ceftriaxone and 7 days of doxycycline for pharyngeal chlamydia, urethral gonorrhea and rectal gonorrhea and chlamydia.  The patient has been sexually active with 1 male partner and reports that his partner was treated at the same time he was for gonorrhea and chlamydia.  He denies any throat, rectal pain or penile discharge.    ROS: A complete review of systems are negative other than that noted in the HPI        Objective   /75 (BP Location: Right arm, Patient Position: Sitting, Cuff Size: Large)   Pulse 88   Temp (!) 97 °F (36.1 °C) (Tympanic)   Resp 18   Ht 5' 10\" (1.778 m)   Wt 79.1 kg (174 lb 6.4 oz)   SpO2 100%   BMI 25.02 kg/m²     General: Alert, interactive, appearing well, nontoxic, no acute distress.  Neck: Supple without lymphadenopathy, no thyromegaly or masses  Throat: Oropharynx moist without lesions.   Lungs: Clear to auscultation bilaterally; no wheezes, rhonchi or rales; respirations unlabored  Heart: RRR; no " "murmur, rub or gallop  Abdomen: Soft, non-tender, non-distended, positive bowel sounds.    Extremities: No clubbing, cyanosis or edema  Skin: No new rashes or lesions. No draining wounds noted.    Lab Results: I have personally reviewed pertinent labs.  Lab Results   Component Value Date    K 4.0 04/07/2025     04/07/2025    CO2 28 04/07/2025    BUN 13 04/07/2025    CREATININE 1.02 04/07/2025    GLUF 80 01/24/2025    CALCIUM 9.4 04/07/2025    AST 18 04/07/2025    ALT 17 04/07/2025    ALKPHOS 48 04/07/2025    EGFR 97 04/07/2025     Lab Results   Component Value Date    WBC 5.84 04/07/2025    WBC 5.9 04/07/2025    HGB 14.7 04/07/2025    HGB 14.5 04/07/2025    HCT 45.4 04/07/2025    HCT 44.2 04/07/2025    MCV 90 04/07/2025    MCV 88 04/07/2025     04/07/2025     04/07/2025     Lab Results   Component Value Date    HEPCAB Non-reactive 08/23/2024     Lab Results   Component Value Date    HAV Non-reactive 08/23/2024    HEPCAB Non-reactive 08/23/2024     Lab Results   Component Value Date    RPR Non-Reactive 01/24/2025     CD4 ABS   Date/Time Value Ref Range Status   04/07/2025 01:00  359 - 1519 /uL Final     No results found for: \"HIVRNAPCR\"  HIV-1 TARGET   Date/Time Value Ref Range Status   04/09/2025 02:09 PM Detected (A) Not Detected Final       "

## 2025-04-11 NOTE — PROGRESS NOTES
Data: Pt completed the PHQ-9 screening within ID clinic, however, [facility] couldn't discuss the results at the time due to conflicting individual session. Pt scored was (PHQ-9=4) as a display of minimal depressive traits without SI or HI. Results will be discussed with pt to address multidimensional stability on a phone f/u, or direct pt contact.

## 2025-04-11 NOTE — ASSESSMENT & PLAN NOTE
History of secondary syphilis January 2024. RPR 1:16 dil 1/15/2024. Status post 2 doses of IM benzathine penicillin G.  RPR remains nonreactive.               -Repeat RPR every 6 months

## 2025-04-11 NOTE — ASSESSMENT & PLAN NOTE
1/25 rectal and pharyngeal chlamydia were positive.  Patient treated with 7-day course of doxycycline.  Urine testing negative   - Repeat rectal and pharyngeal GC/CT for GARRY   - Discussed Doxy PEP if patient has any new sexual partners  Orders:    MISCELLANEOUS LAB TEST; Future    MISCELLANEOUS LAB TEST; Future

## 2025-04-14 ENCOUNTER — RESULTS FOLLOW-UP (OUTPATIENT)
Dept: OTHER | Facility: HOSPITAL | Age: 30
End: 2025-04-14

## 2025-04-14 ENCOUNTER — PATIENT OUTREACH (OUTPATIENT)
Dept: SURGERY | Facility: CLINIC | Age: 30
End: 2025-04-14

## 2025-04-14 LAB
MISCELLANEOUS LAB TEST RESULT: NORMAL
MISCELLANEOUS LAB TEST RESULT: NORMAL

## 2025-04-14 RX ORDER — BICTEGRAVIR SODIUM, EMTRICITABINE, AND TENOFOVIR ALAFENAMIDE FUMARATE 50; 200; 25 MG/1; MG/1; MG/1
1 TABLET ORAL
Qty: 90 TABLET | Refills: 0 | Status: SHIPPED | OUTPATIENT
Start: 2025-04-14

## 2025-04-14 NOTE — PROGRESS NOTES
This cm received a call from Inocencia from [facility]. Inocencia let this cm know ct's SPBP was still pending due to a mistake made on the application. This cm let Inocencia know this cm would make the corrections and resubmit the application.     Corrects were made. Documents were faxed to [facility]. Fax confirmation sheet was scanned into ct's chart.

## 2025-04-15 ENCOUNTER — PATIENT OUTREACH (OUTPATIENT)
Dept: SURGERY | Facility: CLINIC | Age: 30
End: 2025-04-15

## 2025-04-15 NOTE — PROGRESS NOTES
Ct SPBP has been approved thru 7/31/2025.     Ct was made aware. Ct aware once he receives his new SPBP card to send this cm a copy of his card when he receives it.    This cm made ; Brook aware of the approval.

## 2025-04-17 ENCOUNTER — PATIENT OUTREACH (OUTPATIENT)
Dept: SURGERY | Facility: CLINIC | Age: 30
End: 2025-04-17

## 2025-04-17 NOTE — PROGRESS NOTES
Ct provided this  with last [facility]'s bank statements and updated car information.    MAWD application was completed and faxed and emailed to [facility] Assistance Office. Application was scanned into ct's chart with fax confirmation sheet.

## 2025-04-21 ENCOUNTER — PATIENT OUTREACH (OUTPATIENT)
Dept: SURGERY | Facility: CLINIC | Age: 30
End: 2025-04-21

## 2025-04-21 ENCOUNTER — TELEPHONE (OUTPATIENT)
Dept: SURGERY | Facility: CLINIC | Age: 30
End: 2025-04-21

## 2025-04-21 NOTE — TELEPHONE ENCOUNTER
Spoke with patient and reminded him to recheck his viral load four weeks after finishing the bottle of prezcobix and staying with only biktarvy. Patient states he doesn't have that much left and he will let me know when he goes to get his lab re done. Patient verbalizes understanding, no questions or concerns.

## 2025-05-15 ENCOUNTER — PATIENT OUTREACH (OUTPATIENT)
Dept: SURGERY | Facility: CLINIC | Age: 30
End: 2025-05-15

## 2025-05-15 NOTE — PROGRESS NOTES
This cm was on hold the [employer] for 30 minutes prior to getting connected with a . Mr. Bean made aware when this com contacted [facility] previously, ct's social security number was not correct due to the [facility] putting his social security incorrectly. Mr. Bean had difficulty located ct's case. Phone disconnected.     This cm contacted [facility] and spoke to  Ms. Faustin. Ms. Faustin was able to locate ct's case. Ms. Faustin was made aware this cm was following up with ct's MAWD application that was submitted on April 11th. Ms. Faustin let this cm know he was rejected due to no proof of income. This cm let Ms. Faustin know this cm submitted his income with the application. Ms. Faustin let this cm know she was able to locate ct's income information. Ms. Faustin let this cm know she would send the case to his  for a reconsideration.      This cm sent ct a text making him aware of this cm's conversation with [facility].

## 2025-05-21 ENCOUNTER — PATIENT OUTREACH (OUTPATIENT)
Dept: SURGERY | Facility: CLINIC | Age: 30
End: 2025-05-21

## 2025-06-01 DIAGNOSIS — F33.1 MODERATE EPISODE OF RECURRENT MAJOR DEPRESSIVE DISORDER (HCC): ICD-10-CM

## 2025-06-02 RX ORDER — BUPROPION HYDROCHLORIDE 150 MG/1
150 TABLET ORAL DAILY
Qty: 90 TABLET | Refills: 0 | Status: SHIPPED | OUTPATIENT
Start: 2025-06-02

## 2025-06-02 NOTE — TELEPHONE ENCOUNTER
Requested medication(s) are due for refill today: Yes  Patient has already received a courtesy refill: No  Other reason request has been forwarded to provider: last seen by dr gibson 4/2024

## 2025-06-03 DIAGNOSIS — B00.9 HERPETIC LESIONS: Primary | ICD-10-CM

## 2025-06-03 DIAGNOSIS — Z72.52 HIGH RISK HOMOSEXUAL BEHAVIOR: ICD-10-CM

## 2025-06-03 DIAGNOSIS — Z77.21 PERSONAL HISTORY OF EXPOSURE TO POTENTIALLY HAZARDOUS BODY FLUIDS: ICD-10-CM

## 2025-06-03 DIAGNOSIS — Z11.59 ENCOUNTER FOR SCREENING FOR OTHER VIRAL DISEASES: ICD-10-CM

## 2025-06-03 DIAGNOSIS — Z20.2 CONTACT WITH AND (SUSPECTED) EXPOSURE TO INFECTIONS WITH A PREDOMINANTLY SEXUAL MODE OF TRANSMISSION: ICD-10-CM

## 2025-06-03 DIAGNOSIS — A53.9 SYPHILIS: ICD-10-CM

## 2025-06-03 DIAGNOSIS — Z11.3 ENCOUNTER FOR SCREENING FOR BACTERIAL SEXUALLY TRANSMITTED DISEASE: ICD-10-CM

## 2025-06-03 DIAGNOSIS — K62.9 ANAL LESION: ICD-10-CM

## 2025-06-04 ENCOUNTER — PATIENT OUTREACH (OUTPATIENT)
Dept: SURGERY | Facility: CLINIC | Age: 30
End: 2025-06-04

## 2025-06-04 NOTE — PROGRESS NOTES
Ct let this cm know he has working wages now and has been working for a couple of weeks. Ct aware to send this cm a copy of recent income as soon as possible to submit to the [facility].

## 2025-06-04 NOTE — PROGRESS NOTES
Ct aware to contact the clinic as soon as possible. This cm provided ct with contact number for clinic.

## 2025-06-10 ENCOUNTER — PATIENT OUTREACH (OUTPATIENT)
Dept: SURGERY | Facility: CLINIC | Age: 30
End: 2025-06-10

## 2025-06-25 DIAGNOSIS — B20 HIV DISEASE (HCC): ICD-10-CM

## 2025-06-26 RX ORDER — BICTEGRAVIR SODIUM, EMTRICITABINE, AND TENOFOVIR ALAFENAMIDE FUMARATE 50; 200; 25 MG/1; MG/1; MG/1
1 TABLET ORAL
Qty: 90 TABLET | Refills: 0 | Status: SHIPPED | OUTPATIENT
Start: 2025-06-26

## 2025-07-11 ENCOUNTER — TELEPHONE (OUTPATIENT)
Dept: SURGERY | Facility: CLINIC | Age: 30
End: 2025-07-11

## 2025-07-11 NOTE — TELEPHONE ENCOUNTER
LVM for a call back. Wanted to know if patient was done with his prexcobix since he was supposed to get a viral load done after 4 weeks after stopping it.

## 2025-07-21 ENCOUNTER — PATIENT OUTREACH (OUTPATIENT)
Dept: SURGERY | Facility: CLINIC | Age: 30
End: 2025-07-21

## 2025-07-21 ENCOUNTER — TELEPHONE (OUTPATIENT)
Dept: SURGERY | Facility: CLINIC | Age: 30
End: 2025-07-21

## 2025-07-21 DIAGNOSIS — Z13.1 SCREENING FOR DIABETES MELLITUS: ICD-10-CM

## 2025-07-21 DIAGNOSIS — Z20.2 CONTACT WITH AND (SUSPECTED) EXPOSURE TO INFECTIONS WITH A PREDOMINANTLY SEXUAL MODE OF TRANSMISSION: ICD-10-CM

## 2025-07-21 DIAGNOSIS — A54.9 GONORRHEA: ICD-10-CM

## 2025-07-21 DIAGNOSIS — Z11.3 ENCOUNTER FOR SCREENING FOR BACTERIAL SEXUALLY TRANSMITTED DISEASE: ICD-10-CM

## 2025-07-21 DIAGNOSIS — A74.9 CHLAMYDIA: ICD-10-CM

## 2025-07-21 DIAGNOSIS — B20 HUMAN IMMUNODEFICIENCY VIRUS (HIV) DISEASE (HCC): Primary | ICD-10-CM

## 2025-07-21 DIAGNOSIS — Z13.6 ENCOUNTER FOR LIPID SCREENING FOR CARDIOVASCULAR DISEASE: ICD-10-CM

## 2025-07-21 DIAGNOSIS — Z72.89 OTHER PROBLEMS RELATED TO LIFESTYLE: ICD-10-CM

## 2025-07-21 DIAGNOSIS — A53.9 SYPHILIS: ICD-10-CM

## 2025-07-21 DIAGNOSIS — D72.89 OTHER SPECIFIED DISORDERS OF WHITE BLOOD CELLS: ICD-10-CM

## 2025-07-21 DIAGNOSIS — Z13.220 ENCOUNTER FOR LIPID SCREENING FOR CARDIOVASCULAR DISEASE: ICD-10-CM

## 2025-07-21 NOTE — TELEPHONE ENCOUNTER
Spoke with patient and gave a reminded to get labs done for upcoming ID appointment on 8/1. He states he did finished his medicine.

## 2025-07-24 DIAGNOSIS — F33.1 MODERATE EPISODE OF RECURRENT MAJOR DEPRESSIVE DISORDER (HCC): ICD-10-CM

## 2025-07-24 RX ORDER — BUPROPION HYDROCHLORIDE 150 MG/1
150 TABLET ORAL DAILY
Qty: 90 TABLET | Refills: 0 | Status: SHIPPED | OUTPATIENT
Start: 2025-07-24

## 2025-07-29 ENCOUNTER — PATIENT OUTREACH (OUTPATIENT)
Dept: SURGERY | Facility: CLINIC | Age: 30
End: 2025-07-29

## 2025-07-30 ENCOUNTER — APPOINTMENT (OUTPATIENT)
Dept: LAB | Facility: HOSPITAL | Age: 30
End: 2025-07-30
Payer: COMMERCIAL

## 2025-07-30 DIAGNOSIS — Z11.3 ENCOUNTER FOR SCREENING FOR BACTERIAL SEXUALLY TRANSMITTED DISEASE: ICD-10-CM

## 2025-07-30 DIAGNOSIS — A53.9 SYPHILIS: ICD-10-CM

## 2025-07-30 DIAGNOSIS — B00.9 HERPETIC LESIONS: ICD-10-CM

## 2025-07-30 DIAGNOSIS — Z20.2 CONTACT WITH AND (SUSPECTED) EXPOSURE TO INFECTIONS WITH A PREDOMINANTLY SEXUAL MODE OF TRANSMISSION: ICD-10-CM

## 2025-07-30 DIAGNOSIS — B20 HUMAN IMMUNODEFICIENCY VIRUS (HIV) DISEASE (HCC): ICD-10-CM

## 2025-07-30 DIAGNOSIS — Z11.59 ENCOUNTER FOR SCREENING FOR OTHER VIRAL DISEASES: ICD-10-CM

## 2025-07-30 DIAGNOSIS — Z77.21 PERSONAL HISTORY OF EXPOSURE TO POTENTIALLY HAZARDOUS BODY FLUIDS: ICD-10-CM

## 2025-07-30 DIAGNOSIS — Z72.52 HIGH RISK HOMOSEXUAL BEHAVIOR: ICD-10-CM

## 2025-07-30 DIAGNOSIS — K62.9 ANAL LESION: ICD-10-CM

## 2025-07-30 LAB
ALBUMIN SERPL BCG-MCNC: 4.5 G/DL (ref 3.5–5)
ALP SERPL-CCNC: 45 U/L (ref 34–104)
ALT SERPL W P-5'-P-CCNC: 22 U/L (ref 7–52)
ANION GAP SERPL CALCULATED.3IONS-SCNC: 6 MMOL/L (ref 4–13)
AST SERPL W P-5'-P-CCNC: 31 U/L (ref 13–39)
BASOPHILS # BLD AUTO: 0.05 THOUSANDS/ÂΜL (ref 0–0.1)
BASOPHILS NFR BLD AUTO: 1 % (ref 0–1)
BILIRUB SERPL-MCNC: 0.27 MG/DL (ref 0.2–1)
BILIRUB UR QL STRIP: NEGATIVE
BUN SERPL-MCNC: 10 MG/DL (ref 5–25)
CALCIUM SERPL-MCNC: 9.3 MG/DL (ref 8.4–10.2)
CHLORIDE SERPL-SCNC: 103 MMOL/L (ref 96–108)
CHOLEST SERPL-MCNC: 147 MG/DL (ref ?–200)
CLARITY UR: CLEAR
CO2 SERPL-SCNC: 27 MMOL/L (ref 21–32)
COLOR UR: COLORLESS
CREAT SERPL-MCNC: 0.9 MG/DL (ref 0.6–1.3)
EOSINOPHIL # BLD AUTO: 0.26 THOUSAND/ÂΜL (ref 0–0.61)
EOSINOPHIL NFR BLD AUTO: 4 % (ref 0–6)
ERYTHROCYTE [DISTWIDTH] IN BLOOD BY AUTOMATED COUNT: 13.9 % (ref 11.6–15.1)
EST. AVERAGE GLUCOSE BLD GHB EST-MCNC: 111 MG/DL
GFR SERPL CREATININE-BSD FRML MDRD: 113 ML/MIN/1.73SQ M
GLUCOSE P FAST SERPL-MCNC: 93 MG/DL (ref 65–99)
GLUCOSE UR STRIP-MCNC: NEGATIVE MG/DL
HBA1C MFR BLD: 5.5 %
HCT VFR BLD AUTO: 43.8 % (ref 36.5–49.3)
HDLC SERPL-MCNC: 48 MG/DL
HGB BLD-MCNC: 14.4 G/DL (ref 12–17)
HGB UR QL STRIP.AUTO: NEGATIVE
IMM GRANULOCYTES # BLD AUTO: 0.02 THOUSAND/UL (ref 0–0.2)
IMM GRANULOCYTES NFR BLD AUTO: 0 % (ref 0–2)
KETONES UR STRIP-MCNC: NEGATIVE MG/DL
LDLC SERPL CALC-MCNC: 77 MG/DL (ref 0–100)
LEUKOCYTE ESTERASE UR QL STRIP: NEGATIVE
LYMPHOCYTES # BLD AUTO: 3.49 THOUSANDS/ÂΜL (ref 0.6–4.47)
LYMPHOCYTES NFR BLD AUTO: 54 % (ref 14–44)
MCH RBC QN AUTO: 28.9 PG (ref 26.8–34.3)
MCHC RBC AUTO-ENTMCNC: 32.9 G/DL (ref 31.4–37.4)
MCV RBC AUTO: 88 FL (ref 82–98)
MONOCYTES # BLD AUTO: 0.68 THOUSAND/ÂΜL (ref 0.17–1.22)
MONOCYTES NFR BLD AUTO: 10 % (ref 4–12)
NEUTROPHILS # BLD AUTO: 2.01 THOUSANDS/ÂΜL (ref 1.85–7.62)
NEUTS SEG NFR BLD AUTO: 31 % (ref 43–75)
NITRITE UR QL STRIP: NEGATIVE
NONHDLC SERPL-MCNC: 99 MG/DL
NRBC BLD AUTO-RTO: 0 /100 WBCS
PH UR STRIP.AUTO: 5.5 [PH]
PLATELET # BLD AUTO: 263 THOUSANDS/UL (ref 149–390)
PMV BLD AUTO: 10.1 FL (ref 8.9–12.7)
POTASSIUM SERPL-SCNC: 4.3 MMOL/L (ref 3.5–5.3)
PROT SERPL-MCNC: 7.1 G/DL (ref 6.4–8.4)
PROT UR STRIP-MCNC: NEGATIVE MG/DL
RBC # BLD AUTO: 4.98 MILLION/UL (ref 3.88–5.62)
SODIUM SERPL-SCNC: 136 MMOL/L (ref 135–147)
SP GR UR STRIP.AUTO: 1.01 (ref 1–1.03)
TRIGL SERPL-MCNC: 111 MG/DL (ref ?–150)
UROBILINOGEN UR STRIP-ACNC: <2 MG/DL
WBC # BLD AUTO: 6.51 THOUSAND/UL (ref 4.31–10.16)

## 2025-07-30 PROCEDURE — 85025 COMPLETE CBC W/AUTO DIFF WBC: CPT

## 2025-07-30 PROCEDURE — 86803 HEPATITIS C AB TEST: CPT

## 2025-07-30 PROCEDURE — 81003 URINALYSIS AUTO W/O SCOPE: CPT

## 2025-07-30 PROCEDURE — 86360 T CELL ABSOLUTE COUNT/RATIO: CPT

## 2025-07-30 PROCEDURE — 87536 HIV-1 QUANT&REVRSE TRNSCRPJ: CPT

## 2025-07-30 PROCEDURE — 86592 SYPHILIS TEST NON-TREP QUAL: CPT

## 2025-07-30 PROCEDURE — 36415 COLL VENOUS BLD VENIPUNCTURE: CPT

## 2025-07-30 PROCEDURE — 87591 N.GONORRHOEAE DNA AMP PROB: CPT

## 2025-07-30 PROCEDURE — 83036 HEMOGLOBIN GLYCOSYLATED A1C: CPT

## 2025-07-30 PROCEDURE — 87491 CHLMYD TRACH DNA AMP PROBE: CPT

## 2025-07-30 PROCEDURE — 80061 LIPID PANEL: CPT

## 2025-07-30 PROCEDURE — 80053 COMPREHEN METABOLIC PANEL: CPT

## 2025-07-31 ENCOUNTER — OFFICE VISIT (OUTPATIENT)
Dept: FAMILY MEDICINE CLINIC | Facility: CLINIC | Age: 30
End: 2025-07-31

## 2025-07-31 VITALS
WEIGHT: 169.4 LBS | HEIGHT: 70 IN | DIASTOLIC BLOOD PRESSURE: 70 MMHG | HEART RATE: 89 BPM | BODY MASS INDEX: 24.25 KG/M2 | OXYGEN SATURATION: 99 % | TEMPERATURE: 98 F | SYSTOLIC BLOOD PRESSURE: 112 MMHG

## 2025-07-31 DIAGNOSIS — F33.1 MODERATE EPISODE OF RECURRENT MAJOR DEPRESSIVE DISORDER (HCC): Primary | ICD-10-CM

## 2025-07-31 DIAGNOSIS — Z13.6 ENCOUNTER FOR LIPID SCREENING FOR CARDIOVASCULAR DISEASE: ICD-10-CM

## 2025-07-31 DIAGNOSIS — Z13.220 ENCOUNTER FOR LIPID SCREENING FOR CARDIOVASCULAR DISEASE: ICD-10-CM

## 2025-07-31 DIAGNOSIS — Z20.2 CONTACT WITH AND (SUSPECTED) EXPOSURE TO INFECTIONS WITH A PREDOMINANTLY SEXUAL MODE OF TRANSMISSION: ICD-10-CM

## 2025-07-31 DIAGNOSIS — Z72.89 OTHER PROBLEMS RELATED TO LIFESTYLE: ICD-10-CM

## 2025-07-31 DIAGNOSIS — Z13.1 SCREENING FOR DIABETES MELLITUS: ICD-10-CM

## 2025-07-31 DIAGNOSIS — D72.89 OTHER SPECIFIED DISORDERS OF WHITE BLOOD CELLS: ICD-10-CM

## 2025-07-31 DIAGNOSIS — Z11.1 SCREENING-PULMONARY TB: ICD-10-CM

## 2025-07-31 DIAGNOSIS — Z11.3 ENCOUNTER FOR SCREENING FOR BACTERIAL SEXUALLY TRANSMITTED DISEASE: ICD-10-CM

## 2025-07-31 DIAGNOSIS — B20 HUMAN IMMUNODEFICIENCY VIRUS (HIV) DISEASE (HCC): Primary | ICD-10-CM

## 2025-07-31 DIAGNOSIS — B20 HIV DISEASE (HCC): ICD-10-CM

## 2025-07-31 PROBLEM — A74.9 CHLAMYDIA: Status: RESOLVED | Noted: 2024-08-23 | Resolved: 2025-07-31

## 2025-07-31 PROBLEM — F41.9 ANXIETY: Status: ACTIVE | Noted: 2025-07-31

## 2025-07-31 PROBLEM — A53.9 SYPHILIS: Status: RESOLVED | Noted: 2024-08-23 | Resolved: 2025-07-31

## 2025-07-31 PROBLEM — A54.9 GONORRHEA: Status: RESOLVED | Noted: 2025-01-14 | Resolved: 2025-07-31

## 2025-07-31 LAB
C TRACH DNA SPEC QL NAA+PROBE: NEGATIVE
CD3 CELLS # BLD: 2904 /UL
CD3+CD4+ CELLS # BLD: 1309 /UL
CD3+CD4+ CELLS/CD3+CD8+ CLL BLD: 0.85 RATIO
CD3+CD8+ CELLS # BLD: 1535 /UL
HCV AB SER QL: NORMAL
Lab: 34.12 %
Lab: 3836 /UL
Lab: 40 %
Lab: 75.7 %
N GONORRHOEA DNA SPEC QL NAA+PROBE: NEGATIVE
RPR SER QL: NORMAL

## 2025-07-31 PROCEDURE — 99213 OFFICE O/P EST LOW 20 MIN: CPT | Performed by: NURSE PRACTITIONER

## 2025-07-31 RX ORDER — BUPROPION HYDROCHLORIDE 300 MG/1
300 TABLET ORAL DAILY
Qty: 90 TABLET | Refills: 2 | Status: SHIPPED | OUTPATIENT
Start: 2025-07-31

## 2025-08-01 ENCOUNTER — OFFICE VISIT (OUTPATIENT)
Dept: SURGERY | Facility: CLINIC | Age: 30
End: 2025-08-01
Payer: COMMERCIAL

## 2025-08-01 ENCOUNTER — PATIENT OUTREACH (OUTPATIENT)
Dept: SURGERY | Facility: CLINIC | Age: 30
End: 2025-08-01

## 2025-08-01 ENCOUNTER — TELEPHONE (OUTPATIENT)
Dept: FAMILY MEDICINE CLINIC | Facility: CLINIC | Age: 30
End: 2025-08-01

## 2025-08-01 VITALS
HEIGHT: 70 IN | WEIGHT: 166.8 LBS | DIASTOLIC BLOOD PRESSURE: 73 MMHG | TEMPERATURE: 97.9 F | SYSTOLIC BLOOD PRESSURE: 128 MMHG | BODY MASS INDEX: 23.88 KG/M2 | HEART RATE: 76 BPM | OXYGEN SATURATION: 99 % | RESPIRATION RATE: 17 BRPM

## 2025-08-01 DIAGNOSIS — A74.9 CHLAMYDIA: ICD-10-CM

## 2025-08-01 DIAGNOSIS — Z23 NEED FOR HPV VACCINE: ICD-10-CM

## 2025-08-01 DIAGNOSIS — B20 HUMAN IMMUNODEFICIENCY VIRUS (HIV) DISEASE (HCC): Primary | ICD-10-CM

## 2025-08-01 DIAGNOSIS — A53.9 SYPHILIS: ICD-10-CM

## 2025-08-01 DIAGNOSIS — Z23 NEED FOR HEPATITIS A AND B VACCINATION: ICD-10-CM

## 2025-08-01 DIAGNOSIS — F32.A DEPRESSION, UNSPECIFIED DEPRESSION TYPE: ICD-10-CM

## 2025-08-01 LAB — HIV1 RNA # PLAS NAA DL=20: ABNORMAL {COPIES}/ML

## 2025-08-01 PROCEDURE — 90471 IMMUNIZATION ADMIN: CPT

## 2025-08-01 PROCEDURE — 90651 9VHPV VACCINE 2/3 DOSE IM: CPT

## 2025-08-01 PROCEDURE — 99214 OFFICE O/P EST MOD 30 MIN: CPT | Performed by: STUDENT IN AN ORGANIZED HEALTH CARE EDUCATION/TRAINING PROGRAM

## 2025-08-01 PROCEDURE — 90472 IMMUNIZATION ADMIN EACH ADD: CPT

## 2025-08-01 PROCEDURE — 90636 HEP A/HEP B VACC ADULT IM: CPT

## 2025-08-05 LAB — HIV1 RNA # SERPL NAA+PROBE: NORMAL {COPIES}/ML

## 2025-08-06 ENCOUNTER — PATIENT OUTREACH (OUTPATIENT)
Dept: SURGERY | Facility: CLINIC | Age: 30
End: 2025-08-06

## 2025-08-07 ENCOUNTER — PATIENT OUTREACH (OUTPATIENT)
Dept: SURGERY | Facility: CLINIC | Age: 30
End: 2025-08-07

## 2025-08-08 ENCOUNTER — PATIENT OUTREACH (OUTPATIENT)
Dept: SURGERY | Facility: CLINIC | Age: 30
End: 2025-08-08

## 2025-08-12 ENCOUNTER — PATIENT OUTREACH (OUTPATIENT)
Dept: SURGERY | Facility: CLINIC | Age: 30
End: 2025-08-12

## 2025-08-14 ENCOUNTER — PATIENT OUTREACH (OUTPATIENT)
Dept: SURGERY | Facility: CLINIC | Age: 30
End: 2025-08-14

## 2025-08-18 ENCOUNTER — PATIENT OUTREACH (OUTPATIENT)
Dept: SURGERY | Facility: CLINIC | Age: 30
End: 2025-08-18